# Patient Record
Sex: FEMALE | Race: WHITE | NOT HISPANIC OR LATINO | Employment: OTHER
[De-identification: names, ages, dates, MRNs, and addresses within clinical notes are randomized per-mention and may not be internally consistent; named-entity substitution may affect disease eponyms.]

---

## 2017-10-15 ENCOUNTER — HEALTH MAINTENANCE LETTER (OUTPATIENT)
Age: 22
End: 2017-10-15

## 2021-07-15 ENCOUNTER — HOSPITAL ENCOUNTER (INPATIENT)
Facility: CLINIC | Age: 26
LOS: 13 days | Discharge: PSYCHIATRIC OR SUBSTANCE USE FACILITY | End: 2021-07-29
Attending: EMERGENCY MEDICINE | Admitting: PSYCHIATRY & NEUROLOGY
Payer: COMMERCIAL

## 2021-07-15 DIAGNOSIS — Z11.52 ENCOUNTER FOR SCREENING LABORATORY TESTING FOR SEVERE ACUTE RESPIRATORY SYNDROME CORONAVIRUS 2 (SARS-COV-2): ICD-10-CM

## 2021-07-15 DIAGNOSIS — R45.851 SUICIDAL IDEATION: ICD-10-CM

## 2021-07-15 DIAGNOSIS — F25.9 SCHIZOAFFECTIVE DISORDER, SUBCHRONIC CONDITION (H): Primary | ICD-10-CM

## 2021-07-15 DIAGNOSIS — R44.0 AUDITORY HALLUCINATIONS: ICD-10-CM

## 2021-07-15 PROCEDURE — C9803 HOPD COVID-19 SPEC COLLECT: HCPCS | Performed by: EMERGENCY MEDICINE

## 2021-07-15 PROCEDURE — 99285 EMERGENCY DEPT VISIT HI MDM: CPT | Mod: 25 | Performed by: EMERGENCY MEDICINE

## 2021-07-15 PROCEDURE — 99285 EMERGENCY DEPT VISIT HI MDM: CPT | Performed by: EMERGENCY MEDICINE

## 2021-07-15 ASSESSMENT — ENCOUNTER SYMPTOMS
FEVER: 0
ABDOMINAL PAIN: 0
SHORTNESS OF BREATH: 0

## 2021-07-16 ENCOUNTER — DOCUMENTATION ONLY (OUTPATIENT)
Dept: OTHER | Facility: CLINIC | Age: 26
End: 2021-07-16

## 2021-07-16 ENCOUNTER — TELEPHONE (OUTPATIENT)
Dept: BEHAVIORAL HEALTH | Facility: CLINIC | Age: 26
End: 2021-07-16

## 2021-07-16 PROBLEM — R45.851 SUICIDAL IDEATION: Status: ACTIVE | Noted: 2021-07-16

## 2021-07-16 LAB
SARS-COV-2 RNA RESP QL NAA+PROBE: NEGATIVE
VALPROATE SERPL-MCNC: 10 MG/L

## 2021-07-16 PROCEDURE — 80307 DRUG TEST PRSMV CHEM ANLYZR: CPT | Performed by: STUDENT IN AN ORGANIZED HEALTH CARE EDUCATION/TRAINING PROGRAM

## 2021-07-16 PROCEDURE — 124N000002 HC R&B MH UMMC

## 2021-07-16 PROCEDURE — 250N000013 HC RX MED GY IP 250 OP 250 PS 637: Performed by: EMERGENCY MEDICINE

## 2021-07-16 PROCEDURE — 82306 VITAMIN D 25 HYDROXY: CPT | Performed by: STUDENT IN AN ORGANIZED HEALTH CARE EDUCATION/TRAINING PROGRAM

## 2021-07-16 PROCEDURE — 250N000013 HC RX MED GY IP 250 OP 250 PS 637: Performed by: STUDENT IN AN ORGANIZED HEALTH CARE EDUCATION/TRAINING PROGRAM

## 2021-07-16 PROCEDURE — 36415 COLL VENOUS BLD VENIPUNCTURE: CPT | Performed by: STUDENT IN AN ORGANIZED HEALTH CARE EDUCATION/TRAINING PROGRAM

## 2021-07-16 PROCEDURE — 90791 PSYCH DIAGNOSTIC EVALUATION: CPT

## 2021-07-16 PROCEDURE — 87635 SARS-COV-2 COVID-19 AMP PRB: CPT | Performed by: EMERGENCY MEDICINE

## 2021-07-16 PROCEDURE — 250N000013 HC RX MED GY IP 250 OP 250 PS 637

## 2021-07-16 PROCEDURE — 80164 ASSAY DIPROPYLACETIC ACD TOT: CPT | Performed by: STUDENT IN AN ORGANIZED HEALTH CARE EDUCATION/TRAINING PROGRAM

## 2021-07-16 RX ORDER — OLANZAPINE 10 MG/1
10 TABLET, ORALLY DISINTEGRATING ORAL ONCE
Status: COMPLETED | OUTPATIENT
Start: 2021-07-16 | End: 2021-07-16

## 2021-07-16 RX ORDER — LANOLIN ALCOHOL/MO/W.PET/CERES
3 CREAM (GRAM) TOPICAL
Status: DISCONTINUED | OUTPATIENT
Start: 2021-07-16 | End: 2021-07-19

## 2021-07-16 RX ORDER — POLYETHYLENE GLYCOL 3350 17 G/17G
17 POWDER, FOR SOLUTION ORAL DAILY PRN
Status: DISCONTINUED | OUTPATIENT
Start: 2021-07-16 | End: 2021-07-29 | Stop reason: HOSPADM

## 2021-07-16 RX ORDER — ATOMOXETINE 40 MG/1
40 CAPSULE ORAL DAILY
Status: DISCONTINUED | OUTPATIENT
Start: 2021-07-17 | End: 2021-07-19

## 2021-07-16 RX ORDER — ACETAMINOPHEN 325 MG/1
650 TABLET ORAL EVERY 4 HOURS PRN
Status: DISCONTINUED | OUTPATIENT
Start: 2021-07-16 | End: 2021-07-29 | Stop reason: HOSPADM

## 2021-07-16 RX ORDER — OLANZAPINE 5 MG/1
5 TABLET, ORALLY DISINTEGRATING ORAL AT BEDTIME
Status: DISCONTINUED | OUTPATIENT
Start: 2021-07-16 | End: 2021-07-19

## 2021-07-16 RX ORDER — HYDROXYZINE HYDROCHLORIDE 25 MG/1
25 TABLET, FILM COATED ORAL EVERY 4 HOURS PRN
Status: DISCONTINUED | OUTPATIENT
Start: 2021-07-16 | End: 2021-07-29 | Stop reason: HOSPADM

## 2021-07-16 RX ORDER — OLANZAPINE 10 MG/1
10 TABLET ORAL 3 TIMES DAILY PRN
Status: DISCONTINUED | OUTPATIENT
Start: 2021-07-16 | End: 2021-07-29 | Stop reason: HOSPADM

## 2021-07-16 RX ORDER — MAGNESIUM HYDROXIDE/ALUMINUM HYDROXICE/SIMETHICONE 120; 1200; 1200 MG/30ML; MG/30ML; MG/30ML
30 SUSPENSION ORAL EVERY 4 HOURS PRN
Status: DISCONTINUED | OUTPATIENT
Start: 2021-07-16 | End: 2021-07-29 | Stop reason: HOSPADM

## 2021-07-16 RX ORDER — DIVALPROEX SODIUM 500 MG/1
500 TABLET, DELAYED RELEASE ORAL 2 TIMES DAILY
Status: DISCONTINUED | OUTPATIENT
Start: 2021-07-16 | End: 2021-07-17

## 2021-07-16 RX ORDER — OLANZAPINE 10 MG/2ML
10 INJECTION, POWDER, FOR SOLUTION INTRAMUSCULAR 3 TIMES DAILY PRN
Status: DISCONTINUED | OUTPATIENT
Start: 2021-07-16 | End: 2021-07-29 | Stop reason: HOSPADM

## 2021-07-16 RX ADMIN — MELATONIN TAB 3 MG 3 MG: 3 TAB at 23:23

## 2021-07-16 RX ADMIN — OLANZAPINE 5 MG: 5 TABLET, ORALLY DISINTEGRATING ORAL at 20:32

## 2021-07-16 RX ADMIN — OLANZAPINE 10 MG: 10 TABLET, ORALLY DISINTEGRATING ORAL at 00:41

## 2021-07-16 ASSESSMENT — ACTIVITIES OF DAILY LIVING (ADL)
DRESS: SCRUBS (BEHAVIORAL HEALTH)
CONCENTRATING,_REMEMBERING_OR_MAKING_DECISIONS_DIFFICULTY: NO
DIFFICULTY_COMMUNICATING: NO
LAUNDRY: WITH SUPERVISION
TOILETING_ISSUES: NO
DRESSING/BATHING_DIFFICULTY: NO
HEARING_DIFFICULTY_OR_DEAF: NO
WALKING_OR_CLIMBING_STAIRS_DIFFICULTY: NO
WEAR_GLASSES_OR_BLIND: NO
FALL_HISTORY_WITHIN_LAST_SIX_MONTHS: NO
DIFFICULTY_EATING/SWALLOWING: NO
HYGIENE/GROOMING: INDEPENDENT
DOING_ERRANDS_INDEPENDENTLY_DIFFICULTY: NO
ORAL_HYGIENE: INDEPENDENT

## 2021-07-16 ASSESSMENT — ENCOUNTER SYMPTOMS
HALLUCINATIONS: 1
SLEEP DISTURBANCE: 1

## 2021-07-16 ASSESSMENT — MIFFLIN-ST. JEOR: SCORE: 2071.72
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
## 2021-07-16 NOTE — SAFE
Erika Claudio  July 16, 2021    Pt is a 25 y.o.  female comes to ED by ambulance for assessment of aggressive and psychotic symptoms, new onset of auditory hallucinations following recent medication change with increased an labile mood, impaired sleep and property damage, some implied but no active suicidal statements.  Mother is guardian and agreeable to voluntary admission.      Current Suicidal Ideation/Self-Injurious Concerns/Methods: None - N/A    Inappropriate Sexual Behavior: No    Aggression/Homicidal Ideation: Agitation/Hyperactivity and Impaired Self-Control      For additional details see full DEC assessment.       Henry Hameed

## 2021-07-16 NOTE — ED NOTES
[facility] ED Mental Health Handoff Note:       Brief HPI:  This is a 25 year old female signed out to me by Dr. Joe.  See initial ED Provider note for full details of the presentation. Interval history is pertinent for SI and hallucinations.    Home meds reviewed and ordered/administered: Yes    Medically stable for inpatient mental health admission: Yes.    Evaluated by mental health: Yes. The recommendation is for inpatient mental health treatment. Bed search in process    Safety concerns: At the time I received sign out, there were no safety concerns.    Hold Status:  Active Orders   N/A            Exam:   Patient Vitals for the past 24 hrs:   BP Temp Temp src Pulse SpO2   07/15/21 2217 118/82 (!) 96.7  F (35.9  C) Oral (!) 128 97 %           ED Course:    Medications   OLANZapine zydis (zyPREXA) ODT tab 10 mg (10 mg Oral Given 7/16/21 0041)            There were no significant events during my shift.    Patient was signed out to the oncoming provider      Impression:    ICD-10-CM    1. Auditory hallucinations  R44.0    2. Suicidal ideation  R45.851        Plan:    1. Awaiting inpatient mental health admission/transfer. Patient Holdable if tries to leave      RESULTS:   Results for orders placed or performed during the hospital encounter of 07/15/21 (from the past 24 hour(s))   Asymptomatic COVID-19 Virus (Coronavirus) by PCR Nasopharyngeal     Status: Normal    Collection Time: 07/16/21 12:36 AM    Specimen: Nasopharyngeal; Swab    Narrative    The following orders were created for panel order Asymptomatic COVID-19 Virus (Coronavirus) by PCR Nasopharyngeal.  Procedure                               Abnormality         Status                     ---------                               -----------         ------                     SARS-COV2 (COVID-19) Vir...[[id number]]  Normal              Final result                 Please view results for these tests on the individual orders.   SARS-COV2 (COVID-19)  Virus RT-PCR     Status: Normal    Collection Time: 07/16/21 12:36 AM    Specimen: Nasopharyngeal; Swab   Result Value Ref Range    SARS CoV2 PCR Negative Negative    Narrative    Testing was performed using the felecia  SARS-CoV-2 & Influenza A/B Assay on the felecia  Aby  System.  This test should be ordered for the detection of SARS-COV-2 in individuals who meet SARS-CoV-2 clinical and/or epidemiological criteria. Test performance is unknown in asymptomatic patients.  This test is for in vitro diagnostic use under the FDA EUA for laboratories certified under CLIA to perform moderate and/or high complexity testing. This test has not been FDA cleared or approved.  A negative test does not rule out the presence of PCR inhibitors in the specimen or target RNA in concentration below the limit of detection for the assay. The possibility of a false negative should be considered if the patient's recent exposure or clinical presentation suggests COVID-19.  [facility] are certified under the Clinical Laboratory Improvement Amendments of 1988 (CLIA-88) as qualified to perform moderate and/or high complexity laboratory testing.             MD Vince Lockwood Matthew Joseph, MD  07/16/21 0451

## 2021-07-16 NOTE — ED NOTES
Bed: HW04  Expected date:   Expected time:   Means of arrival:   Comments:  [facility] [address] 25F WAYNE eason

## 2021-07-16 NOTE — ED NOTES
Spoke with pt in room. Calm in room. Cooperative with mental health assessment. Denies HI/SI. Denies auditory or visual hallucinations. Dress is appropriate to situation. Attentive to conversation. Quiet voice. Pt resting in bed. No further needs at this time.

## 2021-07-16 NOTE — ED NOTES
Writer talked with pt's  Dougie ([phone]). SW'er recommended placement at either [facility] or [facility] because they're closer to the SW'er and family. Will escalate this request. Mother is located in [place].

## 2021-07-16 NOTE — TELEPHONE ENCOUNTER
"Patient cleared and ready for behavioral bed placement: Yes   S: DEC call/chart review, 0022, 25/F, [place] ED, hallucinations and aggressive behaviors    B: Hx of schizoaffective and intellectual disability. Pt reports AH of voices that whisper negative things about her. Pt's mother reports pt has become more agitated/labile at home after a medication change from Zyprexa to Seroquel. Pt has been yelling and damaging property. No aggression reported in ED. No hx of drug/etoh use. Hx of Inpt MH admissions through [facility] - recently discharged from [place] on Monday. Currently on a MI commitment through [facility], started Oct of 2020.    A: Voluntary - mom is guardian and willing to go anywhere for placement. No paperwork in chart  No medical concerns. Ambulates independently.  Asymptomatic for Covid - test processing  UDS: Not ordered  HCG: Not ordered    R: Pt placed on work list  0028 - DEC called back. Reported he found something on the [place] court records stating mom is guardian and will fax to intake. \"[employer]\" r/t guardianship is in Right Fax time stamped: 7/16/2021 @ 12:32AM. No appropriate beds currently available at [facility] or [facility]. Unsure how far mom is willing to seek placement. 0446 - Called ED and requested if willing to go outside of [facility] system and how far, to call intake back.  Pt remains on work list and intake seeking appropriate placement options.    "

## 2021-07-16 NOTE — ED NOTES
Writer received a call from Lauren Bay ( with [facility]). Lauren wanted to remind the team that pt is on a civil commitment until 3/20/2022.  would like an update on placement when available. They can be reached at [phone].
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
## 2021-07-16 NOTE — ED PROVIDER NOTES
ED Provider Note  [facility]      History     Chief Complaint   Patient presents with     Hallucinations     Per EMS, patient is schizoaffective, family could not handle her.  Pt was seen at [facility] on 7/12/21.  Family wanted her to come here.     The history is provided by the patient and the EMS personnel.     Erika Claudio is a 25 year old female with a medical history significant for intellectual disability, schizoaffective disorder versus bipolar affective disorder, ADHD, PTSD and methamphetamine abuse who presents to the Emergency Department for mental health evaluation.  Patient here reports that she was angry at her brother so her brother called the  on her.  She reports that she was brought here by the police for further evaluation.  Patient reports that she was angry at her brother because she thought he was calling her names.  She denies trying to hurt her brother.  Patient denies any suicidal or homicidal ideation.  She denies any hallucinations.  She denies any drug or alcohol use.    Per EMS, they were told by the patient's family that she has a history of schizoaffective disorder and her family was unable to handle her tonight, so they wanted her to be brought here for evaluation.  EMS also reported that the patient was seen at [facility] on 7/12/2021, but we do not have access to these records.    We did have the DEC  evaluate the patient and they were able to get additional history.  The patient is currently on Depakote, Strattera and hydroxyzine.  The patient was recently taken off of Zyprexa a few weeks ago and was started on Seroquel on 7/9/2021 (7 days ago).  The family reports that since the patient was taken off of the Zyprexa she has been having auditory hallucinations, which she has never had in the past.  Patient is hearing whispers of people that are talking about her.  Patient will then go after people near her as she believes that they are the  "ones talking about her.  Today, the patient damaged a door trying to get to her brother.  The mother also adds that the patient has been making suicidal comments recently.  The patient does not have any history of suicidal ideation in the past.  She reports that the patient the other day made a statement \"good thing you did not let me go for a walk yesterday otherwise I would have walked straight off of the bridge\".  The mother adds that the patient has not been sleeping well at night.  Family does not have any concern for drug or alcohol abuse.  The patient has not had any self-injurious behavior.  The mother also notes that the patient is supposed to be in a group home, but this was not a stable place for her so her  has been trying to find new placement for the patient.    Please see DEC 's note in Epic dated 07/15/21 for further details.      Past Medical History  History reviewed. No pertinent past medical history.  History reviewed. No pertinent surgical history.  No current outpatient medications on file.    No Known Allergies  Past medical history, past surgical history, medications, and allergies were reviewed with the patient. Additional pertinent items: bipolar affective disorder, ADHD, PTSD and methamphetamine abuse    Family History  History reviewed. No pertinent family history.  Family history was reviewed with the patient. Additional pertinent items: None    Social History  Social History     Tobacco Use     Smoking status: Never Smoker     Smokeless tobacco: Never Used   Substance Use Topics     Alcohol use: Not Currently     Drug use: Never      Social history was reviewed with the patient. Additional pertinent items: None      Review of Systems   Constitutional: Negative for fever.   Respiratory: Negative for shortness of breath.    Cardiovascular: Negative for chest pain.   Gastrointestinal: Negative for abdominal pain.   Psychiatric/Behavioral: Positive for behavioral " problems, hallucinations (auditory), sleep disturbance and suicidal ideas.   All other systems reviewed and are negative.        Physical Exam   BP: 118/82  Pulse: (!) 128  Temp: (!) 96.7  F (35.9  C)  SpO2: 97 %  Physical Exam  Vitals and nursing note reviewed.   Constitutional:       General: She is not in acute distress.     Appearance: She is well-developed. She is not ill-appearing or toxic-appearing.   HENT:      Head: Normocephalic and atraumatic.   Eyes:      General: No scleral icterus.     Pupils: Pupils are equal, round, and reactive to light.   Cardiovascular:      Rate and Rhythm: Normal rate.   Pulmonary:      Effort: Pulmonary effort is normal. No respiratory distress.   Musculoskeletal:      Cervical back: Normal range of motion and neck supple.   Skin:     General: Skin is warm and dry.      Coloration: Skin is not pale.      Findings: No rash.   Neurological:      Mental Status: She is alert and oriented to person, place, and time.      Sensory: No sensory deficit.   Psychiatric:         Attention and Perception: Attention normal.         Mood and Affect: Mood is anxious. Affect is labile.         Speech: Speech normal.         Behavior: Behavior is cooperative.         Thought Content: Thought content is paranoid. Thought content is not delusional. Thought content does not include homicidal or suicidal (patient denies) ideation.      Comments: Patient sitting in the hallway in a chair, she is leaning forward in her chair and she talks to me she is intermittently diverting her gaze off to the side as though responding to something.  She appears somewhat frightened and anxious, but is able to cooperate with history.           ED Course      Procedures     10:29 PM  The patient was seen and examined by Len Joe MD in [facility].             Results for orders placed or performed during the hospital encounter of 07/15/21   SARS-COV2 (COVID-19) Virus RT-PCR     Status: Normal    Specimen:  Nasopharyngeal; Swab   Result Value Ref Range    SARS CoV2 PCR Negative Negative    Narrative    Testing was performed using the felecia  SARS-CoV-2 & Influenza A/B Assay on the felecia  Aby  System.  This test should be ordered for the detection of SARS-COV-2 in individuals who meet SARS-CoV-2 clinical and/or epidemiological criteria. Test performance is unknown in asymptomatic patients.  This test is for in vitro diagnostic use under the FDA EUA for laboratories certified under CLIA to perform moderate and/or high complexity testing. This test has not been FDA cleared or approved.  A negative test does not rule out the presence of PCR inhibitors in the specimen or target RNA in concentration below the limit of detection for the assay. The possibility of a false negative should be considered if the patient's recent exposure or clinical presentation suggests COVID-19.  [facility] are certified under the Clinical Laboratory Improvement Amendments of 1988 (CLIA-88) as qualified to perform moderate and/or high complexity laboratory testing.   Asymptomatic COVID-19 Virus (Coronavirus) by PCR Nasopharyngeal     Status: Normal    Specimen: Nasopharyngeal; Swab    Narrative    The following orders were created for panel order Asymptomatic COVID-19 Virus (Coronavirus) by PCR Nasopharyngeal.  Procedure                               Abnormality         Status                     ---------                               -----------         ------                     SARS-COV2 (COVID-19) Vir...[[id number]]  Normal              Final result                 Please view results for these tests on the individual orders.     Medications   OLANZapine zydis (zyPREXA) ODT tab 10 mg (10 mg Oral Given 7/16/21 0041)        Assessments & Plan (with Medical Decision Making)     Patient presented to the emergency department after lashing out at home and anger because she thought that her brother was saying things about her.   Please refer to the DEC 's report for more formal history of visit.  According to supplemental history patient has been hearing voices whispering and calling her names.  She is become increasingly anxious and paranoid about this to the point where she has at times lashed out violently.  She has never hurt anyone but when convinced that other people are talking about her she does get quite angry.  She had also made suicidal statements to her family but she denies this to me.  I can find no acute medical condition that would preclude a mental health admission and at this time feel admission for stabilization and medical management is warranted.  Patient's mother is her guardian and is in agreement with the plan.  The  spoke with mental health intake and bed is being arranged.    I have reviewed the nursing notes. I have reviewed the findings, diagnosis, plan and need for follow up with the patient.    New Prescriptions    No medications on file       Final diagnoses:   Auditory hallucinations   Suicidal ideation       --  I, Gerardo Richey, am serving as a trained medical scribe to document services personally performed by Len Joe MD, based on the provider's statements to me.     ILen MD, was physically present and have reviewed and verified the accuracy of this note documented by Gerardo Richey.    Len Joe MD  [facility] EMERGENCY DEPARTMENT  7/15/2021     Len Joe MD  07/16/21 2595

## 2021-07-16 NOTE — TELEPHONE ENCOUNTER
R: The pt is currently in the [place] ER awaiting bed placement.    9:45a Intake called [place] [facility] RN to get the pt's bed placement preferences. RN will connect with the pt and call intake back.     9:49a Per RN the pt would like [facility] only for placement.    Per [facility]- no beds available at this time.     [facility] is at capacity. The pt remains on the work-list. Intake identifying appropriate bed placement.     10:50a Intake checked bed status for units- Unit 20 has pending discharges.     11:36a Intake paged provider to present for Unit 20 ([facility]).     11:37a Provider returned intakes page- provider accepts the pt.    11:48a Intake called ER RN for an up to date note on the pt's status- no note has been placed today.    11:57a Intake called Unit 20 Charge RN to go over admission in que. Per Charge RN they will call the ER for report, unit needs to wait for discharges and cleaning.     12:00p Intake called [place] [facility] to go over bed placement information.

## 2021-07-16 NOTE — PROGRESS NOTES
07/16/21 1623   Patient Belongings   Did you bring any home meds/supplements to the hospital?  No   Patient Belongings locker   Belongings Search Yes   Clothing Search Yes   Second Staff Leeann Z       2 puzzles books  1 sun glasses  2 PJ pants  1 black shirt  1 under wear  1 blanket  2 pair of slippers  1 bra  A               Admission:  I am responsible for any personal items that are not sent to the safe or pharmacy.  [facility] is not responsible for loss, theft or damage of any property in my possession.    Signature:  _________________________________ Date: _______  Time: _____                                              Staff Signature:  ____________________________ Date: ________  Time: _____      2nd Staff person, if patient is unable/unwilling to sign:    Signature: ________________________________ Date: ________  Time: _____     Discharge:  [facility] has returned all of my personal belongings:    Signature: _________________________________ Date: ________  Time: _____                                          Staff Signature:  ____________________________ Date: ________  Time: _____

## 2021-07-16 NOTE — ED NOTES
"7/15/2021  Erika Claudio 1995     [facility] Crisis Assessment:    Started at: 12:20 AM  Completed at: 01:30 AM  Patient was assessed via in-person.    Chief Complaint and History of Presenting Problem:    Pt is a 25 y.o.  female comes to [facility] by ambulance for assessment of aggressive and psychotic symptoms.  Assessment and intervention involved meeting with pt, obtaining collateral from [facility] and [facility] records and from mother/guardian Magnolia by phone ([phone]), employing crisis psychotherapy, supportive and active listening and motivational interviewing techniques.  Please see list of involved providers and coping strategies used in other sections of this document.  Pt is alert, oriented x 3, adequately dressed and groomed, speech is articulate, delayed rate and normal volume, mood is anxious, affect restricted, thought process organized, content includes reported psychosis, cooperative with the assessment process.      Background    Patient lives with family members in home with mother and younger brother. She stopped attending HS in the 10th grade, has IEP.  Patient is not currently employed. Patient receives additional income from Disability: SSDI about $800/month. Patient is under guardianship and commitment    Mental Health History and Current Symptoms     Patient identifies historical diagnoses of Schizoaffective D/O, Intellectual Disability, PTSD and Amphetamine Use D/O. Pt reports ambulance was called tonight because she had punched a hole in the door of her brother's bedroom because \"I was mad\" at him calling her names.  She says she did this Thursday morning about 9 AM, but he was not there, had gone to work (she thought he was home).  She says that when he later got home he and her mother \"thought I was hearing voices - which I'm not - and sent me here\".  Pt denies she has symptoms of psychosis, but admits she hears people \"whispering\" about her and notes that since her arrival " "she knows that security and other patients have been whispering about her in the ED.  She says she gets mad about this, feels antagonized and then gets agitated.  She says she has not been sleeping well, her \"sleep schedule is off\", awake much of the night then sleeping a lot in the day.  She often can't remember recent events, not clear if she is avoiding the response or clearly can't recall.  She says she has not been taking her medications because she doesn't like them, denies recent drug or alcohol use, denies recent or current suicidal thoughts.  She says that \"a long time ago\" she has had suicida, self injurious and has engaged in some property damage, but none of these in \"years\".  She is not currently seeing a therapist.    Current Providers  Primary Care Provider: Yes and Laura Franco MD    Psychiatrist: Yes and Gee Amador DO    Therapist: No  : Yes and David Salas    Has an PIETER been signed? No ;     History of psychiatric hospitalizations? Yes  History of civil commitment? Yes  History of programmatic care? No  Current psychotropic medications? Yes  Medication Compliant? No  Recent medication changes? No    Relevant Medical Concerns  Patient identifies concerns with completing ADLs? Yes and Impaired sleep  Patient can ambulate independently? Yes  Other medical health concerns? No  History of concussion or TBI? No     Abuse, Maltreatment, and Trauma History  Physical abuse: No  Emotional/psychological abuse: No  Sexual abuse: No  Loss of a friend or family member to suicide: No  Other identified traumatic event or significant stressor: No    Current Symptoms  Attention, Hyperactivity, and Impulsivity: Yes: Impulsive   Anxiety:Yes: Generalized Symptoms: Agitation, Avoidance and Excessive worry    Behavioral Difficulties: Yes: Impulsivity/Disinhibition and Negativistic/Defiant   Mood Symptoms: Yes: Aggression, Feelings of helplessness  and Sleep disturbance  " "  Appetite: No   Feeding and Eating: No  Interpersonal Functioning: Yes: Displaces Blame, Impaired Impulse Control and Impaired Interpersonal Functioning  Learning Disabilities/Cognitive/Developmental Disorders: Yes: Developmental Incidents, Functional Impairments and Self-Regulation   General Cognitive Impairments: No  If yes, see completed Mini-Cog Assessment below.  Sleep: No   Psychosis: Yes: Delusions: Paranoid: Auditory hallucinations that people are talking about her    Trauma: No     Substance Use  Patient does  have a history of substance use which includes amphetamine use disorder, denies recent use.    History of Suicidal Ideation, Suicide Attempts, and Risk Formulation:     Pt reports recent suicidal thoughts, denies current ideation or intent, mother says pt has made several recent threats including \"had you let me take a walk today I would have jumped off the bridge\" and \"watch it or you'll have another  to go to\".    ESS-6  1.a. Over the past 2 weeks, have you had thoughts of killing yourself? Yes   1.b. Have you ever attempted to kill yourself and, if yes, when did this last happen? Yes Within last 6 months  2. Recent or current suicide plan? No  3. Recent or current intent to act on ideation? No  4. Lifetime psychiatric hospitalization? Yes  5. Pattern of excessive substance use? No  6. Current irritability, agitation, or aggression? No  ESS-6 Score: Mild    Patient has past history of self-injury, no recent behavior    Other Risk Areas    Aggressive/assumptive/homicidal risk factors: Yes: Agitation / Hyperactivity and Impaired Self Control   Duty to warn?No   Was a Child Protection Report Made? No   Was a Adult Protection Report Made? No      Sexually inappropriate behavior? No      Vulnerability to sexual exploitation? No     Mental Status Exam:  Affect: Constricted  Appearance: Appropriate   Attention Span/Concentration: Attentive    Eye Contact: Variable  Fund of Knowledge: Appropriate "   Language /Speech Content: Fluent  Language /Speech Volume: Normal   Language /Speech Rate/Productions: Normal   Recent Memory: Variable  Remote Memory: Variable  Mood: Anxious and Depressed   Orientation:   Person: Yes   Place: Yes  Time of Day: Yes   Date: Yes   Situation (Do they understand why they are here?): Yes   Psychomotor Behavior: Normal   Thought Content: Clear  Thought Form: Intact    Clinical Summary and Disposition  Clinical summary:     Pt comes by ambulance after increased symptoms of catalina and psychosis, guardian reports recent change in medications, Zyprexa stopped several weeks ago and replaced with Seroquel, guardian not clear the reason, now experiencing auditory hallucinations, mood and behavioral dysregulation and recent property damage, guardian does not feel pt is stable to be in the community, recommend inpatient admission.    Diagnosis:    Schizoaffective D/O; Mild intellectual functioning; amphetamine use disorder by history.    Disposition:  Attending provider, Dr. Joe consulted and does  agree with recommended disposition which includes Inpatient Mental Health. Patient agrees with recommended level of care.      Details of final disposition include: Inpatient mental health .     If Inpatient, is patient admitted voluntary? Yes   Patient aware of potential for transfer if there is not appropriate placement? Yes  Patient is willing to travel outside of the [facility] for placement? Yes  Central Intake Notified? Yes: Date: 7/16/2021 Time: 12:49 AM.    Duration of face to face time with patient in minutes: 1.0 hrs    CPT code(s) utilized: 83541 - Psychotherapy for Crisis - 60 (30-74*) min      Henry Hameed

## 2021-07-17 LAB
ALBUMIN SERPL-MCNC: 2.8 G/DL (ref 3.4–5)
ALP SERPL-CCNC: 47 U/L (ref 40–150)
ALT SERPL W P-5'-P-CCNC: 17 U/L (ref 0–50)
AMPHETAMINES UR QL SCN: NORMAL
ANION GAP SERPL CALCULATED.3IONS-SCNC: 2 MMOL/L (ref 3–14)
AST SERPL W P-5'-P-CCNC: 7 U/L (ref 0–45)
BARBITURATES UR QL: NORMAL
BENZODIAZ UR QL: NORMAL
BILIRUB SERPL-MCNC: 0.4 MG/DL (ref 0.2–1.3)
BUN SERPL-MCNC: 13 MG/DL (ref 7–30)
CALCIUM SERPL-MCNC: 8.5 MG/DL (ref 8.5–10.1)
CANNABINOIDS UR QL SCN: NORMAL
CHLORIDE BLD-SCNC: 112 MMOL/L (ref 94–109)
CHOLEST SERPL-MCNC: 155 MG/DL
CO2 SERPL-SCNC: 25 MMOL/L (ref 20–32)
COCAINE UR QL: NORMAL
CREAT SERPL-MCNC: 0.78 MG/DL (ref 0.52–1.04)
ETHANOL UR QL SCN: NORMAL
FASTING STATUS PATIENT QL REPORTED: YES
FOLATE SERPL-MCNC: 9.8 NG/ML
GFR SERPL CREATININE-BSD FRML MDRD: >90 ML/MIN/1.73M2
GLUCOSE BLD-MCNC: 88 MG/DL (ref 70–99)
HDLC SERPL-MCNC: 31 MG/DL
LDLC SERPL CALC-MCNC: 95 MG/DL
NONHDLC SERPL-MCNC: 124 MG/DL
OPIATES UR QL SCN: NORMAL
POTASSIUM BLD-SCNC: 4 MMOL/L (ref 3.4–5.3)
PROT SERPL-MCNC: 5.9 G/DL (ref 6.8–8.8)
SODIUM SERPL-SCNC: 139 MMOL/L (ref 133–144)
TRIGL SERPL-MCNC: 145 MG/DL
TSH SERPL DL<=0.005 MIU/L-ACNC: 2.6 MU/L (ref 0.4–4)
VIT B12 SERPL-MCNC: 618 PG/ML (ref 193–986)

## 2021-07-17 PROCEDURE — 85027 COMPLETE CBC AUTOMATED: CPT | Performed by: STUDENT IN AN ORGANIZED HEALTH CARE EDUCATION/TRAINING PROGRAM

## 2021-07-17 PROCEDURE — 82040 ASSAY OF SERUM ALBUMIN: CPT | Performed by: STUDENT IN AN ORGANIZED HEALTH CARE EDUCATION/TRAINING PROGRAM

## 2021-07-17 PROCEDURE — 36415 COLL VENOUS BLD VENIPUNCTURE: CPT | Performed by: STUDENT IN AN ORGANIZED HEALTH CARE EDUCATION/TRAINING PROGRAM

## 2021-07-17 PROCEDURE — 250N000013 HC RX MED GY IP 250 OP 250 PS 637

## 2021-07-17 PROCEDURE — 250N000013 HC RX MED GY IP 250 OP 250 PS 637: Performed by: STUDENT IN AN ORGANIZED HEALTH CARE EDUCATION/TRAINING PROGRAM

## 2021-07-17 PROCEDURE — 83036 HEMOGLOBIN GLYCOSYLATED A1C: CPT | Performed by: STUDENT IN AN ORGANIZED HEALTH CARE EDUCATION/TRAINING PROGRAM

## 2021-07-17 PROCEDURE — 124N000002 HC R&B MH UMMC

## 2021-07-17 PROCEDURE — 82746 ASSAY OF FOLIC ACID SERUM: CPT | Performed by: STUDENT IN AN ORGANIZED HEALTH CARE EDUCATION/TRAINING PROGRAM

## 2021-07-17 PROCEDURE — 84443 ASSAY THYROID STIM HORMONE: CPT | Performed by: STUDENT IN AN ORGANIZED HEALTH CARE EDUCATION/TRAINING PROGRAM

## 2021-07-17 PROCEDURE — 82607 VITAMIN B-12: CPT | Performed by: STUDENT IN AN ORGANIZED HEALTH CARE EDUCATION/TRAINING PROGRAM

## 2021-07-17 PROCEDURE — 99223 1ST HOSP IP/OBS HIGH 75: CPT | Mod: AI | Performed by: PSYCHIATRY & NEUROLOGY

## 2021-07-17 PROCEDURE — 80061 LIPID PANEL: CPT | Performed by: STUDENT IN AN ORGANIZED HEALTH CARE EDUCATION/TRAINING PROGRAM

## 2021-07-17 RX ORDER — ATOMOXETINE 40 MG/1
40 CAPSULE ORAL DAILY
Status: ON HOLD | COMMUNITY
End: 2021-07-28

## 2021-07-17 RX ORDER — HYDROXYZINE PAMOATE 25 MG/1
25 CAPSULE ORAL EVERY 6 HOURS PRN
Status: ON HOLD | COMMUNITY
End: 2021-07-28

## 2021-07-17 RX ORDER — MEDROXYPROGESTERONE ACETATE 150 MG/ML
150 INJECTION, SUSPENSION INTRAMUSCULAR
Status: ON HOLD | COMMUNITY
End: 2021-07-28

## 2021-07-17 RX ORDER — QUETIAPINE FUMARATE 25 MG/1
25 TABLET, FILM COATED ORAL
Status: ON HOLD | COMMUNITY
End: 2021-07-28

## 2021-07-17 RX ORDER — DOCOSANOL 100 MG/G
CREAM TOPICAL
COMMUNITY

## 2021-07-17 RX ADMIN — MELATONIN TAB 3 MG 3 MG: 3 TAB at 22:17

## 2021-07-17 RX ADMIN — OLANZAPINE 5 MG: 5 TABLET, ORALLY DISINTEGRATING ORAL at 20:22

## 2021-07-17 RX ADMIN — ATOMOXETINE 40 MG: 40 CAPSULE ORAL at 08:54

## 2021-07-17 ASSESSMENT — ACTIVITIES OF DAILY LIVING (ADL)
HYGIENE/GROOMING: INDEPENDENT
ORAL_HYGIENE: INDEPENDENT
DRESS: INDEPENDENT
LAUNDRY: WITH SUPERVISION

## 2021-07-17 NOTE — PLAN OF CARE
Initial Psychosocial Assessment    I have reviewed the chart, met with the patient, and developed Care Plan.  Information for assessment was obtained from:   Pt refused to meet with Writer was resting in bed, offered to come back later, and she stated she would still not meet with Writer.   Writer did use information from H&P completed by Dr. Rodney Cardenas MD and Admitting RN on 7/16/2021 this information is noted and italicized.     Presenting Problem:  Per Admitting RN on Station 20 on 7/16/2021:   ADMISSION  SITUATION  Received pt, 25 year old, femaile, came in for hallucinations and aggressive behaviors.   BACKGROUND  Pt has history of schizoaffective disorder, ADHD, PTSD, methamphetamine abuse and intellectual disability.   Per report, she was brought to the ER due to auditory hallucinations and pt's aggressive behaviors towards brother. Further per report she has been making suicidal comments recently.   ASSESSMENT  Presents with blunted affect, guarded, labile mood. She was slightly irritable  Pt denies SI/SIB/HI. Denies experiencing hallucinations. Denies having anxiety and depression as of time of assessment.   COVID negative  Pt has a guardian- Mother and is agreeable to voluntary admission.   Bruise on her right inner forearm noted. Pt denies any pain and would not answer writer as to how she got the bruise.   Still to collect urine sample. Pt claimed she will do it after dinner.   RECOMMENDATION  On suicide and assault precautions. Staff will continue to monitor. Pt to see psychiatrist in AM.    History of Mental Health and Chemical Dependency: (Per H&P)   Prior diagnoses: Schizoaffective D/O, Intellectual Disability, PTSD, ADHD and Amphetamine Use  Prior Hospitalizations: Yes, 7 in past year   Suicide attempts: Yes, in past  Self-injurious behavior: Yes, in past usually at [facility]   Violence: Property damage  ECT/TMS: No  History of abusing stimulants     Family Description ([facility], Family  Psychiatric History):  See notes     Significant Life Events (Illness, Abuse, Trauma, Death):  Unknown/None reported     Living Situation:  Currently living with mother and brother pending transfer to a new group home     Educational Background:  Stopped attending highschool in the 10th grade, had an IEP     Occupational History:  Unemployed     Financial Status:  SSDI     Legal Issues:  Under guardianship and is committed until 3/22/2022 per H&P   Guardian is Magnolia Tilley (mother) @ [phone]    Ethnic/Cultural Issues:  See notes     Spiritual Orientation:  None      Service History:  None    Social Functioning (organization, interests):  Not assessed     Current Treatment Providers are:  Primary Outpatient Psychiatrist: Gee Amador   Primary Physician: Laura Franco  Therapist: None  : Lorne Arciniega ([facility])  Probation/: None  Family Members: Mother is guardian, Magnolia [phone]    Social Service Assessment/Plan:  Patient has been admitted for psychiatric stabilization due to symptoms associated with psychosis and possible stimulant use. Patient will have psychiatric assessment and medication management by the psychiatrist. Medications will be reviewed and adjusted per MD as indicated. The treatment team will continue to assess and stabilize the patient's mental health symptoms with the use of medications and therapeutic programming. Hospital staff will provide a safe environment and a therapeutic milieu. Staff will continue to assess patient as needed. Patient will be encouraged to participate in unit groups and activities. Patient will receive individual and group support on the unit.  CTC will do individual inpatient treatment planning and after care planning. CTC will discuss options for increasing community supports with the patient. CTC will coordinate with outpatient providers and will place referrals to ensure appropriate follow up care is in place.

## 2021-07-17 NOTE — PLAN OF CARE
"Pt observed to be out in the milieu, pacing in the hallway, asking for snacks. Presents to be irritable and labile.   Pt still denies having hallucinations, anxiety and depression. Denies SI/SIB/HI.   Consumed 100% of share of dinner and took approximately 720 ml of fluids.   Pt refused to take divalproex. Re approached but still refused. Claimed \"No! I don't want it. I just want the other one.\"  Due Zyprexa given. Denies experiencing side effects.  Pt took a shower and was visited by guardian-Mother this shift.  Urine specimen sent to lab. Awaiting for results.   At 2320H, complained of having difficulty sleeping. PRN melatonin given.   Needs attended to. On suicide and assault precautions. Staff will continue to monitor.  "

## 2021-07-17 NOTE — PLAN OF CARE
Erika appeared to sleep a total of 6.5 hours this night shift.  No prns or snacks given or requested.

## 2021-07-17 NOTE — PHARMACY-ADMISSION MEDICATION HISTORY
"  Admission Medication History Completed by Pharmacy    See [facility] Admission Navigator for allergy information, preferred outpatient pharmacy, prior to admission medications and immunization status.     Medication History Sources:     The patient's legal guardian ([place]), [facility], and Care Everywhere.  7  Changes made to PTA medication list (reason):    Added:   o Atomoxetine 40 mg capsule (per patient's guardian, Care Everywhere and [facility])  o Quetiapine 25 mg tablet (per patient's guardian, Care Everywhere and [facility])  o Hydroxyzine 25 mg capsule (per patient's guardian, Care Everywhere and [facility])  o Depo-Provera 150 mg/mL IM injection (per patient's guardian and Care Everywhere)  o Abreva 10% external cream (per patient's guardian)    Deleted:   o None    Changed:   o None    Additional Information:    The patient granted permission to obtain medication list from her legal guardian. The patient's guardian was able to identify and confirm the patient's current medications and their last doses.    The patient's guardian stated that the patient's Concerta 36 mg tablets was put on hold by the patient's physician. She stated that the physician started the patient on quetiapine 25 mg and wanted to \"see how well it works for her.\"    The patient's guardian stated that the patient's physician discontinued divalproex sodium 500 mg DR this past week on Wednesday (7/14/21).    The patient's guardian stated that the patient takes no other prescription or OTC medications.    Prior to Admission medications    Medication Sig Last Dose Taking? Auth Provider   atomoxetine (STRATTERA) 40 MG capsule Take 40 mg by mouth daily Past Week at Unknown time Yes Unknown, Entered By History   docosanol (ABREVA) 10 % CREA cream Apply topically 5 x daily PRN for cold sore treatment Past Week at Unknown time Yes Unknown, Entered By History   hydrOXYzine (VISTARIL) 25 MG capsule Take 25 mg by mouth every 6 hours as needed " Past Week at Unknown time Yes Unknown, Entered By History   medroxyPROGESTERone (DEPO-PROVERA) 150 MG/ML IM injection Inject 150 mg into the muscle every 3 months 4/27/2021 Yes Unknown, Entered By History   QUEtiapine (SEROQUEL) 25 MG tablet Take 25 mg by mouth nightly as needed Past Week at Unknown time Yes Unknown, Entered By History       Date completed: 07/17/21    Medication history completed by: Trinidad Gallegos

## 2021-07-17 NOTE — PROGRESS NOTES
"Brief Note - Cross Cover    S: Erika declined her depakote for the past 24 hours. She is concerned by the possibility of liver damage, and is not reassured by her normal LFTS. She would like to be on a different medication. Discussed this with her guardian, who agrees to stop depakote, and wants to try using olanzapine for mood instead.    O: /60   Pulse 102   Temp 97.7  F (36.5  C) (Oral)   Resp 18   Ht 1.702 m (5' 7\")   Wt 130.4 kg (287 lb 8 oz)   LMP  (LMP Unknown)   SpO2 97%   BMI 45.03 kg/m       Depakote level: 10    A/P:  Erika Claudio is a 25 year old female with a past psychiatric history of schizoaffective disorder,  intellectual disability, PTSD, ADHD, and substance use disorder (amphetamine) who presented to the ED with out of control behaviors. She slept well last night. Depakote level implies non-adherence at home, likely due to her concern about liver toxicity. She was willing to continue olanzapine.    -discontinue depakote  -continue olanzapine    Henry Woods  PGY2 Psychiatry Resident    "

## 2021-07-17 NOTE — PLAN OF CARE
"  Problem: Suicidal Behavior  Goal: Suicidal Behavior is Absent or Managed  Outcome: No Change     Problem: Behavioral Health Plan of Care  Goal: Plan of Care Review  Outcome: No Change     Problem: Behavioral Health Plan of Care  Goal: Patient-Specific Goal (Individualization)  Outcome: No Change     Pt isolative to room this shift. Come out for meals. Appetite good. Pt refused Depakote in the morning. Pt is irritable this shift. Stated that \"there is just nothing, nothing to do that's why I am sleeping\". Pt denies SI/SIB/Voices/Anxiety/Depression. When asked what is her mood is, she said, \"tired\" then went back to lay down.   "

## 2021-07-18 PROCEDURE — 124N000002 HC R&B MH UMMC

## 2021-07-18 PROCEDURE — 99232 SBSQ HOSP IP/OBS MODERATE 35: CPT | Mod: GC | Performed by: PSYCHIATRY & NEUROLOGY

## 2021-07-18 PROCEDURE — 250N000013 HC RX MED GY IP 250 OP 250 PS 637

## 2021-07-18 RX ADMIN — OLANZAPINE 5 MG: 5 TABLET, ORALLY DISINTEGRATING ORAL at 21:04

## 2021-07-18 RX ADMIN — ATOMOXETINE 40 MG: 40 CAPSULE ORAL at 09:36

## 2021-07-18 ASSESSMENT — ACTIVITIES OF DAILY LIVING (ADL)
LAUNDRY: WITH SUPERVISION
ORAL_HYGIENE: INDEPENDENT
LAUNDRY: WITH SUPERVISION
DRESS: INDEPENDENT
HYGIENE/GROOMING: INDEPENDENT
DRESS: INDEPENDENT
ORAL_HYGIENE: INDEPENDENT
HYGIENE/GROOMING: INDEPENDENT

## 2021-07-18 NOTE — PLAN OF CARE
Pt appears to have slept for 7 hours. No PRNs or snacks given; no concerns noted this shift. Will continue to monitor and offer support.     Problem: Suicidal Behavior  Goal: Suicidal Behavior is Absent or Managed  Outcome: Improving

## 2021-07-18 NOTE — PLAN OF CARE
"  Problem: Adult Inpatient Plan of Care  Goal: Plan of Care Review  Outcome: No Change     Problem: Adult Inpatient Plan of Care  Goal: Patient-Specific Goal (Individualized)  Outcome: No Change       Pt remained in her room this entire time. Pt did not come out for breakfast and lunch. Took morning medication. Pt is irritable and screamed to staff at one point in the morning when blinds were opened for 15 minute check. When approached for lunch, pt still was irritable  and stated \"I don't want it and I don't wan na be disturbed\". Will continue to monitor.  "

## 2021-07-18 NOTE — PLAN OF CARE
This morning staff were doing rounds and open her blinds and patient yelled at staff to close the blinds. Pt in bed thru lunch.  Pt stated she wasn't going to get and wanted to be left alone.  Pt came out of room at about 2:15pm asked for her lunch tray and ate.  Pt has been walking the halls currently.  Problem: Adult Inpatient Plan of Care  Goal: Plan of Care Review  Outcome: No Change  Goal: Patient-Specific Goal (Individualized)  Outcome: No Change  Goal: Absence of Hospital-Acquired Illness or Injury  Outcome: No Change  Goal: Optimal Comfort and Wellbeing  Outcome: No Change  Goal: Readiness for Transition of Care  Outcome: No Change     Problem: Suicidal Behavior  Goal: Suicidal Behavior is Absent or Managed  Outcome: No Change     Problem: Behavioral Health Plan of Care  Goal: Plan of Care Review  Outcome: No Change  Goal: Patient-Specific Goal (Individualization)  Outcome: No Change  Goal: Adheres to Safety Considerations for Self and Others  Outcome: No Change  Goal: Absence of New-Onset Illness or Injury  Outcome: No Change  Goal: Optimized Coping Skills in Response to Life Stressors  Outcome: No Change  Goal: Develops/Participates in Therapeutic [place] to Support Successful Transition  Outcome: No Change     Problem: Suicidal Behavior  Goal: Suicidal Behavior is Absent or Managed  Outcome: No Change

## 2021-07-18 NOTE — PLAN OF CARE
"Pt was visible in the milieu but withdrawn. Pt was on the phone a lot and sat in the lounge for brief moments. Pt took a shower this evening. Presented with a flat affect. Pt denies SI/SIB/AH/VH. Pt says her mood is \"good\". Pt ate dinner and was medication compliant.    Problem: Adult Inpatient Plan of Care  Goal: Optimal Comfort and Wellbeing  Outcome: Improving     Problem: Suicidal Behavior  Goal: Suicidal Behavior is Absent or Managed  Outcome: Improving     Problem: Behavioral Health Plan of Care  Goal: Adheres to Safety Considerations for Self and Others  Outcome: Improving     "

## 2021-07-19 LAB — DEPRECATED CALCIDIOL+CALCIFEROL SERPL-MC: 23 UG/L (ref 20–75)

## 2021-07-19 PROCEDURE — 124N000002 HC R&B MH UMMC

## 2021-07-19 PROCEDURE — 250N000013 HC RX MED GY IP 250 OP 250 PS 637: Performed by: STUDENT IN AN ORGANIZED HEALTH CARE EDUCATION/TRAINING PROGRAM

## 2021-07-19 RX ORDER — LANOLIN ALCOHOL/MO/W.PET/CERES
3 CREAM (GRAM) TOPICAL
Status: DISCONTINUED | OUTPATIENT
Start: 2021-07-19 | End: 2021-07-29 | Stop reason: HOSPADM

## 2021-07-19 RX ADMIN — OLANZAPINE 10 MG: 10 TABLET ORAL at 14:41

## 2021-07-19 RX ADMIN — MELATONIN TAB 3 MG 3 MG: 3 TAB at 23:48

## 2021-07-19 RX ADMIN — OLANZAPINE 15 MG: 10 TABLET, ORALLY DISINTEGRATING ORAL at 23:48

## 2021-07-19 ASSESSMENT — ACTIVITIES OF DAILY LIVING (ADL)
HYGIENE/GROOMING: INDEPENDENT
ORAL_HYGIENE: INDEPENDENT
DRESS: INDEPENDENT

## 2021-07-19 NOTE — PLAN OF CARE
Problem: Adult Inpatient Plan of Care  Goal: Optimal Comfort and Wellbeing  Outcome: No Change  Intervention: Provide Person-Centered Care  Recent Flowsheet Documentation  Taken 7/18/2021 1900 by Brandon Mueller RN  Trust Relationship/Rapport:   care explained   choices provided   emotional support provided   empathic listening provided   questions answered   questions encouraged   reassurance provided   thoughts/feelings acknowledged    Behavioral  Pt was visible in the milieu and was seen making calls where she appeared to be arguing with the person on the other end. Pt denies all mental health symptoms and says she is bored and wants to go home. Pt mom did call and said to the writer that Pt has reported to her that she was hearing the Psychiatrist voices calling her and saying mean things. She however denied saying that when she talked to the mother this evening but when reminded she said that she said the psychiatrist said the mean things but she is not better and ready go go home. Pt's mom is worried that the patient will deny hearing voices which she deals with at home just to be let go. Writer will update the team in report. Pt denies SI thoughts or AVH. Pt endorse good appetite.  Medical  None  Plan  Continue to monitor

## 2021-07-19 NOTE — PLAN OF CARE
Problem: Behavioral Health Plan of Care  Goal: Patient-Specific Goal (Individualization)  Flowsheets (Taken 7/19/2021 0173)  Patient Vulnerabilities:   housing insecurity   substance abuse/addiction   limited social skills   history of unsuccessful treatment   poor impulse control  Patient Personal Strengths:   community support   family/social support  Note:   Personal Plan of Care    Patient goals:  Unable to state goals       Plan for admission:  1. stabilization of mood disorder symptoms.   2. Absence of SI/Safe with self  3. Medication management per Mds  4. Coordination of Care with outpatient providers/family  5. Psychiatric follow up care in place    For CD:    Monitor for symptoms of withdrawal  Complete CD assessment/plan for sobriety

## 2021-07-19 NOTE — PLAN OF CARE
BEHAVIORAL TEAM DISCUSSION    Participants: Dr. Sharp, Psychiatry Resident Dr. Lorene Brown. Laura Duke RN, Brea Solares [facility]  Progress: Initial assessment   Anticipated length of stay: 5-7 days  Continued Stay Criteria/Rationale: HA and aggressive behavior. Needs clinical stabilization and medication management.   Medical/Physical: See H&P note   Precautions:   Behavioral Orders   Procedures    Assault precautions    Code 1 - Restrict to Unit    Discontinue 1:1 attendant for suicide risk     Order Specific Question:   I have performed an in person assessment of the patient     Answer:   Based on this assessment the patient no longer requires a one on one attendant at this point in time.    Routine Programming     As clinically indicated    Self Injury Precaution    Status 15     Every 15 minutes.    Suicide precautions     Patients on Suicide Precautions should have a Combination Diet ordered that includes a Diet selection(s) AND a Behavioral Tray selection for Safe Tray - with utensils, or Safe Tray - NO utensils       Plan: Patient will have ongoing psychiatric assessment. Medications will be reviewed and adjusted per MD as indicated. Outpatient providers will be contacted for care coordination. CTC will continue to assess needs and  ensure appropriate follow up care is in place.   Rationale for change in precautions or plan: None

## 2021-07-19 NOTE — PLAN OF CARE
Pt appears to have slept for  5.75 hours. No PRNs or snacks given; no concerns noted this shift. Will continue to monitor and offer support.        Problem: Suicidal Behavior  Goal: Suicidal Behavior is Absent or Managed  Outcome: Improving

## 2021-07-19 NOTE — PROGRESS NOTES
----------------------------------------------------------------------------------------------------------  [facility]  Psychiatric Progress Note  Hospital Day #3     Subjective     The patient's care was discussed with the treatment team and chart notes were reviewed.     Vitals: VSS  Sleep:  5.75 hours (07/19/21 0700)  Prescribed Medications: Taken as prescribed. Olanzapine 5 mg nightly. Atomoxetine 40 mg in the morning.   PRN Medications: acetaminophen, alum & mag hydroxide-simethicone, hydrOXYzine, melatonin, OLANZapine **OR** OLANZapine, polyethylene glycol     Per Staff Notes:  Over the weekend, the patient refused her depakote so it was discontinued by the on call resident. She was withdrawn for most of the weekend and at one point screamed at the staff due to her blinds being open during a morning check. She intermittently refused meals.     Per Interview:   When we arrived to Erika's room she was sleeping. During the interview, she denied hearing voices or whispers. She said she was feeling low due to being here. When asked if she knew why she was here, she said to get her medications sorted out. She asked multiple times about when she could be discharged. We discussed increasing her olanzapine and she was agreeable to this. She is not having symptoms from her olanzapine. We asked if we could get an EKG and she declined but was not able to give a reason. We discussed discontinuing her atomoxetine and she was agreeable.  I talked with her mother who approved her medication changes and agreed that Erika could return home at the time of discharge.     The risks, benefits, alternatives, and side effects of treatments including no treatment have been discussed and are understood by the patient and other caregivers.    Review of systems:  Complete psychiatric ROS is negative unless otherwise noted above.      Objective     /81 (BP Location: Left arm)   Pulse 91   Temp 97.9  " F (36.6  C) (Oral)   Resp 16   Ht 1.702 m (5' 7\")   Wt 130.4 kg (287 lb 8 oz)   LMP  (LMP Unknown)   SpO2 97%   BMI 45.03 kg/m      Weight is 287 lbs 8 oz  Body mass index is 45.03 kg/m .    Psychiatric Examination:  Appearance:  poorly groomed and fatigued  Muscle Strength and Tone: normal  Gait and Station: Normal  Behavior (Psychomotor):  no evidence of tardive dyskinesia, dystonia, or tics  Eye Contact:  fair  Speech:  clear, coherent  Mood:  depressed  Affect:  intensity is flat and guarded  Attitude:  guarded  Thought Process:  goal oriented  Thought Content:  no evidence of suicidal ideation or homicidal ideation  Associations:  no loose associations  Insight:  limited  Judgment:  limited  Oriented to:  time, person, and place  Attention Span and Concentration:  limited  Recent and Remote Memory:  limited  Language: Fluent in English with appropriate syntax and vocabulary.  Fund of Knowledge: appropriate      No results found for this or any previous visit (from the past 24 hour(s)).     Assessment & Plan      Erika Claudio a 25 year old female with a past psychiatric history of schizoaffective disorder,  intellectual disability, PTSD, ADHD, and substance use disorder (amphetamine) who presented to the ED with out of control behaviors in the context of possible hallucinations and medication non-adherence. Significant symptoms include SI, aggression and impulsive. Her last psychiatric hospitalization was in 9/2020.  She is currently followed by Dr. Amador at [facility]. Current psychosocial stressors include family dynamics which she has been coping with by acting out to others.  Patient's support system includes family and county.  Substance use does not appear to be playing a contributing role in the patient's presentation.  There is genetic loading for CD. Medical history does not appear to be significant.  Psychologically has some reported issues with her mood, particularly related to " fatigue. Social supports include CM and pt's mother; previously in a group home but currently living with mother and brother pending a new group home. The MSE is notable for poor insight into current illness; description of symptoms consistent with AH but denies categorizing them as such. She denies recent self injurious behaviors. Her reported symptoms of agression and possible hallucinations are consistent with her historic diagnosis of schizoaffective disorder vs undifferentiated psychotic disorder. Patient denying symptoms of catalina, but unclear about reliability as a historian. Additionally, she has an intellectual disability which interfere with her ability to adhere with the treatment plan.  Optimization of medications to target these symptom clusters in addition to evaluation of adquate outpatient supports will be targets for treatment during this admission.     Erika Claudio continues to meet criteria for ongoing inpatient hospitalization given reported SI, out of control behaviors and psychosis.    Diagnostic Impression:    The patient's presentation is consistent with Schizoaffective Disorder vs Undifferentiated Psychosis.    Today's Changes  - increase Olanzapine to 15mg at night  - discontinue Atomoxetine until discharge   - Try for EKG tomorrow, patient declined today     Hospital Course:   Erika Claudio was admitted to station 20 with attending Henry Sharp on commitment and will be treated in the therapeutic milieu with appropriate individual and group therapies.  PTA medications depakote, seroquel, atomoxitine, and hydroxyzine.     Psychiatric diagnoses:   # Schizoaffective Disorder  # ADHD  # PTSD  # History of Methamphetamine Use   Medications  - Olanzapine 15 mg nightly  -PRN: acetaminophen, alum & mag hydroxide-simethicone, hydrOXYzine, melatonin, OLANZapine **OR** OLANZapine, polyethylene glycol     # Discontinued Medications (& Rationale):  Seroquel, took her off this and restarted  olanzapine per mom (guardian's) request  Depakote, patient does not what to take, levels suggest non-adherence at home   Hold Atomoxetine 40 mg while inpatient     Medical course:   Patient was medically cleared for admission to inpatient unit. No PTA medications.      Consults: None    Lab/Imagin21: COVID negative. Valproate subtherapeutic. UDS negative. 21: CMP with low albumin (2.8), Lipids, TSH, Folate, B12 normal.    Legal Status: Committed    Disposition: TBD, pending clinical stabilization, medication optimization, and formulation of a safe discharge plan.     Safety Assessment:   Behavioral Orders   Procedures     Assault precautions     Code 1 - Restrict to Unit     Discontinue 1:1 attendant for suicide risk     Order Specific Question:   I have performed an in person assessment of the patient     Answer:   Based on this assessment the patient no longer requires a one on one attendant at this point in time.     Routine Programming     As clinically indicated     Self Injury Precaution     Status 15     Every 15 minutes.     Suicide precautions     Patients on Suicide Precautions should have a Combination Diet ordered that includes a Diet selection(s) AND a Behavioral Tray selection for Safe Tray - with utensils, or Safe Tray - NO utensils          Patient seen and discussed with my attending physician, Dr. Henry Sharp MD who is in agreement with my assessment and plan.    Nae Crane  Medical Student, 3rd Year    Lorene Brown MD  PGY-2 Psychiatry Resident    This patient has been seen and evaluated by me, Henry Sharp.  I have discussed this patient with the psychiatry resident and I agree with the findings and plan in this note.    I have reviewed today's vital signs, medications, labs and imaging.     Henry Luna MD on 2021 at 9:46 AM

## 2021-07-19 NOTE — PLAN OF CARE
"  Problem: Behavioral Health Plan of Care  Goal: Plan of Care Review  Recent Flowsheet Documentation  Taken 7/19/2021 1228 by Maki John RN  Plan of Care Reviewed With: patient  Writer approached pt in her room earlier today and pt yelled \"I already said no\"! Writer explained this was the first interaction we've had and pt yelled \"get out\".   Pt now awake and is calm and pleasant. Pt denies SI/SIB/HI and hallucinations. Pt also denies depression and anxiety. Pt said \"I like it better at home, they said I won't be here too long.\" pt then began to pace the royal waiting for lunch.     After the Dr's talked to pt about stopping one of her medications, straterra, she still kept asking for that medication. Pt was reassured that this medication was discontinued by her Dr but she still kept asking for it.     Pt was in the lounge and started screaming at another pt. This pt said the other pt was \"talking shit behind my back.\" The pt she was referring to is on an SIO and said that pt did not talk to this pt or about her. Pt then went to her room and slammed the door very hard. Pt was told by staff to stay in her room but pt wouldn't listen. Pt then started yelling again about people talking about her. Staff reassured her that nobody is talking about her. Writer asked pt if she was hearing AH, pt denies this but then was heard in her room multiple times talking and yelling to herself.   "

## 2021-07-19 NOTE — PROGRESS NOTES
"   07/19/21 1431   General Information   Date Initially Attended OT 07/19/21     Pt attended <30 mins in a goal setting group - No Charge. Identified \"get on the right meds so I can discharge\" as current goal. OT staff will meet with pt to review the role of occupational therapy and explain the value of having them involved in their treatment plan including options to meet current needs/self-identified goals. As group attendance is established,  continued clinical observations will be made and Pt will be given self-assessment to inform OT initial assessment.    Amina Street, OT on 7/19/2021 at 3:43 PM    "

## 2021-07-19 NOTE — PROGRESS NOTES
EKG Tech/NST Flyer called and talked to Nurse Sanchez to see if this patient was available for EKG to be done. Nurse went to see if patient was awake and reported back to this writer that patient was still sleeping and asked that this writer try again later this afternoon.

## 2021-07-20 PROCEDURE — 250N000013 HC RX MED GY IP 250 OP 250 PS 637: Performed by: STUDENT IN AN ORGANIZED HEALTH CARE EDUCATION/TRAINING PROGRAM

## 2021-07-20 PROCEDURE — H2032 ACTIVITY THERAPY, PER 15 MIN: HCPCS

## 2021-07-20 PROCEDURE — 99232 SBSQ HOSP IP/OBS MODERATE 35: CPT | Mod: GC | Performed by: PSYCHIATRY & NEUROLOGY

## 2021-07-20 PROCEDURE — 90853 GROUP PSYCHOTHERAPY: CPT

## 2021-07-20 PROCEDURE — 124N000002 HC R&B MH UMMC

## 2021-07-20 RX ADMIN — OLANZAPINE 15 MG: 10 TABLET, ORALLY DISINTEGRATING ORAL at 20:44

## 2021-07-20 RX ADMIN — MELATONIN TAB 3 MG 3 MG: 3 TAB at 20:44

## 2021-07-20 ASSESSMENT — ACTIVITIES OF DAILY LIVING (ADL)
HYGIENE/GROOMING: INDEPENDENT
LAUNDRY: WITH SUPERVISION
DRESS: INDEPENDENT;SCRUBS (BEHAVIORAL HEALTH)
ORAL_HYGIENE: INDEPENDENT

## 2021-07-20 NOTE — PLAN OF CARE
"Pt slept for a majority of this shift, she did attend the last group that was offered. Pt stated that she would like to be woken up earlier in the future. She did not have any episodes of agitation today. Pt denies SI/SIB/AH, but appears responding at times and stated that \"I don't want to tell anyone if I'm hearing voices.\" Denies anxiety and depression.   "

## 2021-07-20 NOTE — PLAN OF CARE
Problem: Adult Inpatient Plan of Care  Goal: Plan of Care Review  Outcome: No Change  Flowsheets (Taken 7/20/2021 1029)  Plan of Care Reviewed With: patient    Pt presented with a blunted affect and has been withdrawn and has been irritable. Pt took a shower and staff showed writer a ripped up towel in the linen laundry basket wrapped around a pair of pants. Writer asked pt if she had done that to which pt denied. Pt states she is upset because she is here. Pt denied SI/SIB or hallucinations. Pt has been in the milieu watching movies, paced the halls and participated in group.

## 2021-07-20 NOTE — PLAN OF CARE
"  Problem: Adult Inpatient Plan of Care  Goal: Plan of Care Review  Outcome: No Change     Problem: Adult Inpatient Plan of Care  Goal: Patient-Specific Goal (Individualized)  Outcome: No Change     Problem: Adult Inpatient Plan of Care  Goal: Absence of Hospital-Acquired Illness or Injury  Outcome: No Change     Problem: Adult Inpatient Plan of Care  Goal: Absence of Hospital-Acquired Illness or Injury  Outcome: No Change   Pt had snack early in the afternoon. She was noted to be making phone calls. Then she paced the halls several times.  Presented with an irritable mood. Pt was offered socks when she was noted walking barefoot after a while, responded back \" I wanna f.. leave. I hate this place... this is what you want me to say...\".  Then she continuously paced the halls making some loud noises, pushing the chair by the telephone. Another pt was awakened by the noise, got irritated, said some words to her to which she responded back yelling. Staff intervened. Pt was then asked to go to her room until she calmed down, to which she obliged. Came out a little later, still irritable and disorganized. Paranoid about staff singling her out and not giving her tray right away. Made some paranoid statements saying to other patient \" If you got anything to say about me, say it in my face\".  Pt complained that her tray was  Different from what she ordered, but was corrected when the menu was sent  Back. Other patients were irritated  with her intimidating and aggressive behavior. Pt went straight back to bed and slept. Will continue to monitor.    "

## 2021-07-20 NOTE — PLAN OF CARE
Assessment/Intervention/Current Symptoms and Care Coordination: Met with team. Reviewed patient's chart and progress notes. Writer spoke with patient's CM, Dougie, [phone]. Writer scheduled a meeting between CM and patient at 2:00 PM. Writer called patient's Mother/Guardian, Magnolia at [phone]. Writer provided update on patient's progress. Magnolia confirmed that patient cannot return home at this moment and that there is a plan to transitioned patient into a new group home but that can take some time. Magnolia would like patient to discharge to a mental health residential facility when stable.         Discharge Plan or Goal: Potential IRTS placement.          Barriers to Discharge: Irritable and aggressive. Patient is committed. Needs clinical stabilization and medication management.            Referral Status:         Legal Status: Committed and has legal guardian.

## 2021-07-20 NOTE — PLAN OF CARE
"Music Therapy Group note    Total time in session: 25 minutes    Number of patients in group: 3    Scope of service: psychodynamic     Patient progress: improving slightly - more expressive    Intervention: Music Preferences Assessment     Goal of group: to assess and treat through music     Patient response/reaction to treatment intervention(s): Erika came into group partway through. She appeared dazed and glazed.  She did brighten upon open approach.  Wandered in and out of group several times.      She became expressive talking about how \"I don't pray, but I prayed to leave here!\"  Stated she prayed \"Get me outta here!\"  Her and peer talked about how \"you have to control your emotions to get out of here\" (said by peer), to which Erika said, \"You have to control your actions, you can't control your emotions\" and peer said \"You have to control the way you express your emotions\" and Erika stated they were right, after peer gave an example of options to punch a pillow or get ice when you are mad instead of punching somebody.      Erika was calm, but appearing in her own world at times during group. She chose a song by Dotty Del Rio that she liked, which indicated both normal pop interest, but also possible trauma.  She was calm throughout.     Jeanine Barriga, [facility]  Board-Certified Music Therapist           "

## 2021-07-20 NOTE — PROGRESS NOTES
"    ----------------------------------------------------------------------------------------------------------  [facility]  Psychiatric Progress Note  Hospital Day #4     Subjective     The patient's care was discussed with the treatment team and chart notes were reviewed.     Vitals: VSS  Sleep:  5.75 hours (07/19/21 0700)  Prescribed Medications: Taken as prescribed. Olanzapine 5 mg nightly. Atomoxetine 40 mg in the morning.   PRN Medications: acetaminophen, alum & mag hydroxide-simethicone, hydrOXYzine, OLANZapine **OR** OLANZapine, polyethylene glycol     Per Staff Notes:  Throughout the shift yesterday, Erika had numerous outbursts that were aggressive in nature. Per note \"Pt was in the lounge and started screaming at another pt. This pt said the other pt was \"talking shit behind my back.\" The pt she was referring to is on an SIO and said that pt did not talk to this pt or about her. Pt then went to her room and slammed the door very hard. Pt was told by staff to stay in her room but pt wouldn't listen. Pt then started yelling again about people talking about her. Staff reassured her that nobody is talking about her. Writer asked pt if she was hearing AH, pt denies this but then was heard in her room multiple times talking and yelling to herself. \" In a later note: \" Paranoid about staff singling her out and not giving her tray right away. Made some paranoid statements saying to other patient \" If you got anything to say about me, say it in my face\".  Pt complained that her tray was  Different from what she ordered, but was corrected when the menu was sent  Back. Other patients were irritated  with her intimidating and aggressive behavior.\"     Per Interview:   In the interview, Erika was initially sleeping but sat up to talk with us. She asked about going home and we explained that her mother would prefer she go to an IRTS. She was very against this. We encouraged her to talk with " "her mother about it. We discussed medication and she asked why she can't take the olanzapine PRN. We talked about how it will work better if she is taking it every day. We asked about her perception that people are talking behind her back. She seemed to have more insight into this than previously but would not share what they were saying. She again asked about leaving, stating that there are other people waiting for beds. We broached the topic of potentially starting metformin but she was unable to follow the discussion so we did not continue it. She requested that she be woken up earlier in the morning and inquired about her strattera. We reminded her that we discontinued it while she is here.     The risks, benefits, alternatives, and side effects of treatments including no treatment have been discussed and are understood by the patient and other caregivers.    Review of systems:  Complete psychiatric ROS is negative unless otherwise noted above.      Objective     /66 (BP Location: Right arm)   Pulse 90   Temp 97.7  F (36.5  C) (Oral)   Resp 16   Ht 1.702 m (5' 7\")   Wt 130.4 kg (287 lb 8 oz)   LMP  (LMP Unknown)   SpO2 97%   BMI 45.03 kg/m      Weight is 287 lbs 8 oz  Body mass index is 45.03 kg/m .    Psychiatric Examination:  Appearance:  poorly groomed and fatigued  Muscle Strength and Tone: normal  Gait and Station: Normal  Behavior (Psychomotor):  no evidence of tardive dyskinesia, dystonia, or tics  Eye Contact:  fair  Speech:  clear, coherent  Mood:  depressed  Affect:  intensity is flat and guarded  Attitude:  guarded  Thought Process:  disorganized  Thought Content:  no evidence of suicidal ideation or homicidal ideation paranoid delusions, auditory hallucinations  Associations:  no loose associations  Insight:  limited  Judgment:  limited  Oriented to:  time, person, and place  Attention Span and Concentration:  limited  Recent and Remote Memory:  limited  Language: Fluent in English with " appropriate syntax and vocabulary.  Fund of Knowledge: appropriate      No results found for this or any previous visit (from the past 24 hour(s)).     Assessment & Plan      Erika Claudio a 25 year old female with a past psychiatric history of schizoaffective disorder,  intellectual disability, PTSD, ADHD, and substance use disorder (amphetamine) who presented to the ED with out of control behaviors in the context of possible hallucinations and medication non-adherence. Significant symptoms include SI, aggression and impulsive. Her last psychiatric hospitalization was in 9/2020.  She is currently followed by Dr. Amador at [facility]. Current psychosocial stressors include family dynamics which she has been coping with by acting out to others.  Patient's support system includes family and [facility].  Substance use does not appear to be playing a contributing role in the patient's presentation.  There is genetic loading for CD. Medical history does not appear to be significant.  Psychologically has some reported issues with her mood, particularly related to fatigue. Social supports include CM and pt's mother; previously in a group home but currently living with mother and brother pending a new group home. The MSE is notable for poor insight into current illness; description of symptoms consistent with AH but denies categorizing them as such. She denies recent self injurious behaviors. Her reported symptoms of agression and possible hallucinations are consistent with her historic diagnosis of schizoaffective disorder vs undifferentiated psychotic disorder. Patient denying symptoms of catalina, but unclear about reliability as a historian. Additionally, she has an intellectual disability which interfere with her ability to adhere with the treatment plan.  Optimization of medications to target these symptom clusters in addition to evaluation of adquate outpatient supports will be targets for treatment during this  admission.     Erika Claudio continues to meet criteria for ongoing inpatient hospitalization given reported SI, out of control behaviors and psychosis.    Diagnostic Impression:    The patient's presentation is consistent with Schizoaffective Disorder vs Undifferentiated Psychosis.    Today's Changes  None     Hospital Course:   Erika Claudio was admitted to station 20 with attending Henry Sharp on commitment and will be treated in the therapeutic milieu with appropriate individual and group therapies.  PTA medications depakote, seroquel, atomoxitine, and hydroxyzine.     Psychiatric diagnoses:   # Schizoaffective Disorder  # ADHD  # PTSD  # History of Methamphetamine Use   Medications  - Olanzapine 15 mg nightly  -PRN: acetaminophen, alum & mag hydroxide-simethicone, hydrOXYzine, melatonin, OLANZapine **OR** OLANZapine, polyethylene glycol     # Discontinued Medications (& Rationale):  Seroquel, took her off this and restarted olanzapine per mom (guardian's) request  Depakote, patient does not what to take, levels suggest non-adherence at home   Hold Atomoxetine 40 mg while inpatient     Medical course:   Patient was medically cleared for admission to inpatient unit. No PTA medications.      Consults: None    Lab/Imagin21: COVID negative. Valproate subtherapeutic. UDS negative. 21: CMP with low albumin (2.8), Lipids, TSH, Folate, B12 normal.    Legal Status: Committed    Disposition: TBD, pending clinical stabilization, medication optimization, and formulation of a safe discharge plan.     Safety Assessment:   Behavioral Orders   Procedures     Assault precautions     Code 1 - Restrict to Unit     Discontinue 1:1 attendant for suicide risk     Order Specific Question:   I have performed an in person assessment of the patient     Answer:   Based on this assessment the patient no longer requires a one on one attendant at this point in time.     Routine Programming     As clinically  indicated     Self Injury Precaution     Status 15     Every 15 minutes.     Suicide precautions     Patients on Suicide Precautions should have a Combination Diet ordered that includes a Diet selection(s) AND a Behavioral Tray selection for Safe Tray - with utensils, or Safe Tray - NO utensils          Patient seen and discussed with my attending physician, Dr. Henry Sharp MD who is in agreement with my assessment and plan.    Nae Crane  Medical Student, 3rd Year    Lorene Brown MD  PGY-2 Psychiatry Resident      This patient has been seen and evaluated by me, Henry Sharp.  I have discussed this patient with the psychiatry resident and I agree with the findings and plan in this note.    I have reviewed today's vital signs, medications, labs and imaging.     Henry Luna MD on 7/20/2021 at 10:19 PM

## 2021-07-20 NOTE — PLAN OF CARE
Pt slept for a total of 6 hours during this overnight shift. No PRN medications were administered and no concerns were noted.    Problem: Suicidal Behavior  Goal: Suicidal Behavior is Absent or Managed  Outcome: Improving

## 2021-07-21 PROCEDURE — 90853 GROUP PSYCHOTHERAPY: CPT

## 2021-07-21 PROCEDURE — 99232 SBSQ HOSP IP/OBS MODERATE 35: CPT | Mod: GC | Performed by: PSYCHIATRY & NEUROLOGY

## 2021-07-21 PROCEDURE — 124N000002 HC R&B MH UMMC

## 2021-07-21 PROCEDURE — G0177 OPPS/PHP; TRAIN & EDUC SERV: HCPCS

## 2021-07-21 PROCEDURE — 93005 ELECTROCARDIOGRAM TRACING: CPT | Mod: 76

## 2021-07-21 PROCEDURE — 250N000013 HC RX MED GY IP 250 OP 250 PS 637: Performed by: STUDENT IN AN ORGANIZED HEALTH CARE EDUCATION/TRAINING PROGRAM

## 2021-07-21 PROCEDURE — 93010 ELECTROCARDIOGRAM REPORT: CPT | Performed by: INTERNAL MEDICINE

## 2021-07-21 RX ORDER — OLANZAPINE 20 MG/1
20 TABLET, ORALLY DISINTEGRATING ORAL AT BEDTIME
Status: DISCONTINUED | OUTPATIENT
Start: 2021-07-21 | End: 2021-07-22

## 2021-07-21 RX ADMIN — MELATONIN TAB 3 MG 3 MG: 3 TAB at 20:41

## 2021-07-21 RX ADMIN — OLANZAPINE 20 MG: 20 TABLET, ORALLY DISINTEGRATING ORAL at 20:41

## 2021-07-21 ASSESSMENT — ACTIVITIES OF DAILY LIVING (ADL)
LAUNDRY: WITH SUPERVISION
HYGIENE/GROOMING: INDEPENDENT
ORAL_HYGIENE: INDEPENDENT
DRESS: SCRUBS (BEHAVIORAL HEALTH);INDEPENDENT

## 2021-07-21 NOTE — PROGRESS NOTES
Number of patients attending the group:  4  Group Length:  0.5 Hour    Group Therapy   Summary of Group / Topics Discussed:  The psychotherapy group goal is to promote insight to positive choice and change. Group processing is within a supportive and safe environment. Patients processed emotions using verbal group and expressive psychotherapy interventions.  This group focused on debriefing patients on an incident that happened on the unit. Patients used this time to process their thoughts and used feedback to practice coping strategies.    Assessment: This patient participated in the group. She presented with a flat affect. Pt engaged in the group in the context of listening and asking participants to explain who they are, what they are working on and how they plan to cope with the rest of the night, following the debriefing.    Patient Response: Pt engaged in the group. Reported willingness to practice positive coping skills.

## 2021-07-21 NOTE — PLAN OF CARE
Problem: OT General Care Plan  Goal: OT Goal 1  Description: Erika will demonstrate consistent engagement in OT group activities that support recovery as evidenced by participating in >1 scheduled OT group/day for 5 days.     Date of Service: July 21, 2021    Description: Erika attended 1 occupational therapy group today.   1:15 - 2:00. 4 total participants. Topic group for mental health management: 'Tree of Life' focusing on insight development via metaphors: identification of various past, present, and future aspects of life that do/don't serve them throughout recovery. Pt Response: Overall calm even demeanor. Demonstrated poor frustration tolerance r/t worksheet directions. IND with concrete topics. Required min-mod assist to apply abstract concepts to self. Identified personal value of loyalty and friendship, support from her mom and cats, and hopes for the future (I.e. to be happy, keep her mom in her life, get on correct medications, and become a mother).    Assessment: Demonstrates benefit from engagement in OT groups that support healthy recovery, specifically exploration of positive coping skills for symptom management, relapse prevention, and resumption of personal roles, routines and daily occupations.    Plan: Continue graded occupation-based activities for increased success towards goal and ongoing assessment.     Amina Street, OT on 7/21/2021 at 2:30 PM      Outcome: Improving

## 2021-07-21 NOTE — PROGRESS NOTES
"    ----------------------------------------------------------------------------------------------------------  [facility]  Psychiatric Progress Note  Hospital Day #5     Subjective     The patient's care was discussed with the treatment team and chart notes were reviewed.     Vitals: VSS  Sleep:  5.75 hours (07/19/21 0700)  Prescribed Medications: Taken as prescribed. Olanzapine 5 mg nightly. Atomoxetine 40 mg in the morning.   PRN Medications: acetaminophen, alum & mag hydroxide-simethicone, hydrOXYzine, OLANZapine **OR** OLANZapine, polyethylene glycol     Per Staff Notes:  Erika attended a group yesterday and was noted to be more expressive. She told the nurses she would like to wake up earlier. She admitted that she does not want to tell people if she is hearing  voices. Later she took a shower and may have ripped up the towel (unclear).     Per Interview:   In the interview, Erika was initially asleep but woke up and talked with us. She said she is not hearing people whisper about her. She is eager to go home and would like to go to her mom's. We told her we would touch base about this. We discussed increasing her medications and getting an EKG, she was agreeable to this.     The risks, benefits, alternatives, and side effects of treatments including no treatment have been discussed and are understood by the patient and other caregivers.    Review of systems:  Complete psychiatric ROS is negative unless otherwise noted above.      Objective     /66 (BP Location: Right arm)   Pulse 90   Temp 97.7  F (36.5  C) (Oral)   Resp 16   Ht 1.702 m (5' 7\")   Wt 130.4 kg (287 lb 8 oz)   LMP  (LMP Unknown)   SpO2 97%   BMI 45.03 kg/m      Weight is 287 lbs 8 oz  Body mass index is 45.03 kg/m .    Psychiatric Examination:  Appearance:  poorly groomed and fatigued  Muscle Strength and Tone: normal  Gait and Station: Normal  Behavior (Psychomotor):  no evidence of tardive dyskinesia, " dystonia, or tics. Fidgets and looks around during interview in a way that suggests hypervigilance  Eye Contact:  fair  Speech:  clear, coherent  Mood:  depressed  Affect:  intensity is flat and guarded  Attitude:  guarded  Thought Process:  disorganized  Thought Content:  no evidence of suicidal ideation or homicidal ideation. Positive for paranoid delusions, auditory hallucinations  Associations:  no loose associations  Insight:  limited  Judgment:  limited  Oriented to:  time, person, and place  Attention Span and Concentration:  limited  Recent and Remote Memory:  limited  Language: Fluent in English with appropriate syntax and vocabulary.  Fund of Knowledge: appropriate      No results found for this or any previous visit (from the past 24 hour(s)).     Assessment & Plan      Erika Claudio a 25 year old female with a past psychiatric history of schizoaffective disorder,  intellectual disability, PTSD, ADHD, and substance use disorder (amphetamine) who presented to the ED with out of control behaviors in the context of possible hallucinations and medication non-adherence. Significant symptoms include SI, aggression and impulsive. Her last psychiatric hospitalization was in 9/2020.  She is currently followed by Dr. Amador at [facility]. Current psychosocial stressors include family dynamics which she has been coping with by acting out to others.  Patient's support system includes family and county.  Substance use does not appear to be playing a contributing role in the patient's presentation.  There is genetic loading for CD. Medical history does not appear to be significant.  Psychologically has some reported issues with her mood, particularly related to fatigue. Social supports include CM and pt's mother; previously in a group home but currently living with mother and brother pending a new group home. The MSE is notable for poor insight into current illness; description of symptoms consistent with AH but  denies categorizing them as such. She denies recent self injurious behaviors. Her reported symptoms of agression and possible hallucinations are consistent with her historic diagnosis of schizoaffective disorder vs undifferentiated psychotic disorder. Patient denying symptoms of catalina, but unclear about reliability as a historian. Additionally, she has an intellectual disability which interfere with her ability to adhere with the treatment plan.  Optimization of medications to target these symptom clusters in addition to evaluation of adquate outpatient supports will be targets for treatment during this admission.     Erika Claudio continues to meet criteria for ongoing inpatient hospitalization given reported SI, out of control behaviors and psychosis.    Diagnostic Impression:    The patient's presentation is consistent with Schizoaffective Disorder vs Undifferentiated Psychosis.    Today's Changes  - increase olanzapine to 20mg   - EKG    Hospital Course:   Erika Claudio was admitted to station 20 with attending Henry Sharp on commitment and will be treated in the therapeutic milieu with appropriate individual and group therapies.  PTA medications depakote, seroquel, atomoxitine, and hydroxyzine.     Psychiatric diagnoses:   # Schizoaffective Disorder  # ADHD  # PTSD  # History of Methamphetamine Use   Medications  - Olanzapine 20 mg nightly  -PRN: acetaminophen, alum & mag hydroxide-simethicone, hydrOXYzine, melatonin, OLANZapine **OR** OLANZapine, polyethylene glycol     # Discontinued Medications (& Rationale):  Seroquel, took her off this and restarted olanzapine per mom (guardian's) request  Depakote, patient does not what to take, levels suggest non-adherence at home   Hold Atomoxetine 40 mg while inpatient     Medical course:   Patient was medically cleared for admission to inpatient unit. No PTA medications.      Consults: None    Lab/Imagin21: COVID negative. Valproate  subtherapeutic. UDS negative. 7/17/21: CMP with low albumin (2.8), Lipids, TSH, Folate, B12 normal.    Legal Status: Committed    Disposition: TBD, pending clinical stabilization, medication optimization, and formulation of a safe discharge plan.     Safety Assessment:   Behavioral Orders   Procedures     Assault precautions     Code 1 - Restrict to Unit     Discontinue 1:1 attendant for suicide risk     Order Specific Question:   I have performed an in person assessment of the patient     Answer:   Based on this assessment the patient no longer requires a one on one attendant at this point in time.     Routine Programming     As clinically indicated     Self Injury Precaution     Status 15     Every 15 minutes.     Suicide precautions     Patients on Suicide Precautions should have a Combination Diet ordered that includes a Diet selection(s) AND a Behavioral Tray selection for Safe Tray - with utensils, or Safe Tray - NO utensils          Patient seen and discussed with my attending physician, Dr. Henry Sharp MD who is in agreement with my assessment and plan.    Nae Crane  Medical Student, 3rd Year    Lorene Brown MD  PGY-2 Psychiatry Resident    This patient has been seen and evaluated by me, Henry Sharp.  I have discussed this patient with the psychiatry resident and I agree with the findings and plan in this note.    I have reviewed today's vital signs, medications, labs and imaging.     Henry Luna MD on 7/22/2021 at 10:31 PM

## 2021-07-21 NOTE — PLAN OF CARE
Problem: Adult Inpatient Plan of Care  Goal: Plan of Care Review  7/21/2021 0106 by Theo Clay, RN  Outcome: No Change  7/20/2021 1810 by Theo Clay, RN  Outcome: No Change  Flowsheets (Taken 7/20/2021 2840)  Plan of Care Reviewed With: patient      Pt slept a total of 6.5 hours. No PRNs were given.

## 2021-07-21 NOTE — PLAN OF CARE
Assessment/Intervention/Current Symptoms and Care Coordination: Met with team. Reviewed patient's chart and progress notes. Writer called patient's JOSE LI Lorne Schultz at [phone]. Writer left  requesting a call back. Received call from JOSE LI Lorne schultz. CW indicated that patient could be discharge to an IRTS facility and that will not interfere with her ability to move into a group home.         Discharge Plan or Goal: Potential IRTS placement or home.             Barriers to Discharge: Irritable and aggressive. Patient is committed. Needs clinical stabilization and medication management.               Referral Status:           Legal Status: Committed and has legal guardian.

## 2021-07-21 NOTE — PLAN OF CARE
Patient had a good shift, visible in milieu.  Flat affect, denies SI/SIB, depression/anxiety.  Paces hallway most of the time this shift.  No aggressive behavior noted.  Will continue to monitor closely.

## 2021-07-22 LAB
ATRIAL RATE - MUSE: 77 BPM
DIASTOLIC BLOOD PRESSURE - MUSE: NORMAL MMHG
HBA1C MFR BLD: 5.2 % (ref 0–5.6)
INTERPRETATION ECG - MUSE: NORMAL
P AXIS - MUSE: 41 DEGREES
PR INTERVAL - MUSE: 142 MS
QRS DURATION - MUSE: 82 MS
QT - MUSE: 366 MS
QTC - MUSE: 414 MS
R AXIS - MUSE: 26 DEGREES
SYSTOLIC BLOOD PRESSURE - MUSE: NORMAL MMHG
T AXIS - MUSE: 34 DEGREES
VENTRICULAR RATE- MUSE: 77 BPM

## 2021-07-22 PROCEDURE — 90853 GROUP PSYCHOTHERAPY: CPT

## 2021-07-22 PROCEDURE — 250N000013 HC RX MED GY IP 250 OP 250 PS 637: Performed by: STUDENT IN AN ORGANIZED HEALTH CARE EDUCATION/TRAINING PROGRAM

## 2021-07-22 PROCEDURE — 99232 SBSQ HOSP IP/OBS MODERATE 35: CPT | Mod: GC | Performed by: PSYCHIATRY & NEUROLOGY

## 2021-07-22 PROCEDURE — 124N000002 HC R&B MH UMMC

## 2021-07-22 RX ADMIN — MELATONIN TAB 3 MG 3 MG: 3 TAB at 20:37

## 2021-07-22 RX ADMIN — OLANZAPINE 30 MG: 20 TABLET, ORALLY DISINTEGRATING ORAL at 20:37

## 2021-07-22 ASSESSMENT — ACTIVITIES OF DAILY LIVING (ADL)
DRESS: INDEPENDENT
ORAL_HYGIENE: INDEPENDENT
HYGIENE/GROOMING: INDEPENDENT
LAUNDRY: WITH SUPERVISION

## 2021-07-22 NOTE — PLAN OF CARE
"Patient has spent majority of the shift in the milieu. Denies suicidal ideation and self injurious thoughts. Denies anxiety and depression. Denies auditory and visual hallucinations. Ate dinner. Med compliant. Patient has been a bit loud and restless. Overall cooperative and pleasant on approach.     Patient evaluation continues. Assessed mood,anxiety,thoughts and behavior.     Patient gradually progressing towards goals.    Patient is encouraged to participate in groups and assisted to develop healthy coping skills.     VS reviewed: /80   Pulse 81   Temp 97.2  F (36.2  C) (Oral)   Resp 16   Ht 1.702 m (5' 7\")   Wt 130.4 kg (287 lb 8 oz)   LMP  (LMP Unknown)   SpO2 96%   BMI 45.03 kg/m      Length of stay: 5    Refer to daily team meeting notes for individualized plan of care. Nursing will continue to assess.    "

## 2021-07-22 NOTE — PROGRESS NOTES
"    ----------------------------------------------------------------------------------------------------------  [facility]  Psychiatric Progress Note  Hospital Day #6     Subjective     The patient's care was discussed with the treatment team and chart notes were reviewed.     Vitals: VSS  Sleep:  7 hours (07/22/21 0600)  Prescribed Medications: Taken as prescribed.   PRN Medications: acetaminophen, alum & mag hydroxide-simethicone, hydrOXYzine, OLANZapine **OR** OLANZapine, polyethylene glycol     Per Staff Notes:  Per notes, the patient has been a bit loud and restless but overall cooperative and pleasant.     Per Interview:   When we arrived to the interview, Erika was sleeping. She woke up and briefly chatted with us. She said things are going well and asked when she can go home. We explained that her care team and mother feel she needs to go to an IRTS first. She was upset about this, but after talking about it at length she seemed more agreeable to it. We will continue to revisit this tomorrow.     The risks, benefits, alternatives, and side effects of treatments including no treatment have been discussed and are understood by the patient and other caregivers.    Review of systems:  Complete psychiatric ROS is negative unless otherwise noted above.      Objective     /80   Pulse 81   Temp 97.2  F (36.2  C) (Oral)   Resp 16   Ht 1.702 m (5' 7\")   Wt 130.4 kg (287 lb 8 oz)   LMP  (LMP Unknown)   SpO2 96%   BMI 45.03 kg/m      Weight is 287 lbs 8 oz  Body mass index is 45.03 kg/m .    Psychiatric Examination:  Appearance:  poorly groomed and fatigued  Muscle Strength and Tone: normal  Gait and Station: Normal  Behavior (Psychomotor):  no evidence of tardive dyskinesia, dystonia, or tics. Fidgets and looks around during interview in a way that suggests hypervigilance  Eye Contact:  fair  Speech:  clear, coherent  Mood:  depressed  Affect:  intensity is flat and " guarded  Attitude:  guarded  Thought Process:  disorganized  Thought Content:  no evidence of suicidal ideation or homicidal ideation. Positive for paranoid delusions, auditory hallucinations  Associations:  no loose associations  Insight:  limited  Judgment:  limited  Oriented to:  time, person, and place  Attention Span and Concentration:  limited  Recent and Remote Memory:  limited  Language: Fluent in English with appropriate syntax and vocabulary.  Fund of Knowledge: appropriate      Recent Results (from the past 24 hour(s))   EKG 12-lead, complete    Collection Time: 07/21/21  2:18 PM   Result Value Ref Range    Systolic Blood Pressure  mmHg    Diastolic Blood Pressure  mmHg    Ventricular Rate 77 BPM    Atrial Rate 77 BPM    OR Interval 142 ms    QRS Duration 82 ms     ms    QTc 414 ms    P Axis 41 degrees    R AXIS 26 degrees    T Axis 34 degrees    Interpretation ECG       Sinus rhythm with sinus arrhythmia  Normal ECG  No previous ECGs available          Assessment & Plan      Erika Claudio a 25 year old female with a past psychiatric history of schizoaffective disorder,  intellectual disability, PTSD, ADHD, and substance use disorder (amphetamine) who presented to the ED with out of control behaviors in the context of possible hallucinations and medication non-adherence. Significant symptoms include SI, aggression and impulsive. Her last psychiatric hospitalization was in 9/2020.  She is currently followed by Dr. Amador at [facility]. Current psychosocial stressors include family dynamics which she has been coping with by acting out to others.  Patient's support system includes [facility] and [facility]  Substance use does not appear to be playing a contributing role in the patient's presentation.  There is genetic loading for CD. Medical history does not appear to be significant.  Psychologically has some reported issues with her mood, particularly related to fatigue. Social supports include  and  pt's mother; previously in a group home but currently living with mother and brother pending a new group home. The MSE is notable for poor insight into current illness; description of symptoms consistent with AH but denies categorizing them as such. She denies recent self injurious behaviors. Her reported symptoms of agression and possible hallucinations are consistent with her historic diagnosis of schizoaffective disorder vs undifferentiated psychotic disorder. Patient denying symptoms of catalina, but unclear about reliability as a historian. Additionally, she has an intellectual disability which interfere with her ability to adhere with the treatment plan.  Optimization of medications to target these symptom clusters in addition to evaluation of adquate outpatient supports will be targets for treatment during this admission.     Erika Claudio continues to meet criteria for ongoing inpatient hospitalization given reported SI, out of control behaviors and psychosis.    Diagnostic Impression:  The patient's presentation is consistent with Schizoaffective Disorder vs Undifferentiated Psychosis.    Today's Changes  -Increase olanzapine to 30 mg nightly    Hospital Course:   Erika Claudio was admitted to station 20 with attending Henry Sharp on commitment and will be treated in the therapeutic milieu with appropriate individual and group therapies.  PTA medications depakote, seroquel, atomoxitine, and hydroxyzine. We discontinued the depakote and seroquel. Atomoxitine was held during admission. She was started on Olanzapine which we increased from 5 to 30 mg over time.     Psychiatric diagnoses:   # Schizoaffective Disorder  # ADHD  # PTSD  # History of Methamphetamine Use   Medications  - Olanzapine 30 mg nightly  -PRN: acetaminophen, alum & mag hydroxide-simethicone, hydrOXYzine, melatonin, OLANZapine **OR** OLANZapine, polyethylene glycol     # Discontinued Medications (& Rationale):  Quetiapine, took her  off this and restarted olanzapine per mom (guardian's) request  Divalproex, patient does not what to take, levels suggest non-adherence at home   Hold Atomoxetine 40 mg while inpatient     Medical course:   Patient was medically cleared for admission to inpatient unit. No PTA medications.      Consults: None    Lab/Imagin21: COVID negative. Valproate subtherapeutic. UDS negative. 21: CMP with low albumin (2.8), Lipids, TSH, Folate, B12 normal. 21 Hgb A1c 5.2.  EKG 21 normal sinus rhythm.     Legal Status: Committed    Disposition: TBD, pending clinical stabilization, medication optimization, and formulation of a safe discharge plan.     Safety Assessment:   Behavioral Orders   Procedures     Assault precautions     Code 1 - Restrict to Unit     Discontinue 1:1 attendant for suicide risk     Order Specific Question:   I have performed an in person assessment of the patient     Answer:   Based on this assessment the patient no longer requires a one on one attendant at this point in time.     Routine Programming     As clinically indicated     Self Injury Precaution     Status 15     Every 15 minutes.     Suicide precautions     Patients on Suicide Precautions should have a Combination Diet ordered that includes a Diet selection(s) AND a Behavioral Tray selection for Safe Tray - with utensils, or Safe Tray - NO utensils          Patient seen and discussed with my attending physician, Dr. Henry Sharp MD who is in agreement with my assessment and plan.    Nae Crane  Medical Student, 3rd Year    Lorene Brown MD  PGY-2 Psychiatry Resident    This patient has been seen and evaluated by me, Henry Sharp.  I have discussed this patient with the psychiatry resident and I agree with the findings and plan in this note.    I have reviewed today's vital signs, medications, labs and imaging.     Henry Luna MD on 2021 at 10:35 PM

## 2021-07-22 NOTE — PLAN OF CARE
Assessment/Intervention/Current Symptoms and Care Coordination: Met with team. Reviewed patient's chart and progress notes. Writer called patient's mother/guardian, Magnolia, to discuss treatment progress and discharge discharge disposition. Magnolia confirmed that patient should be discharged to an IRTS. Writer sent IRTS referrals to several [facility] facilities.      Discharge Plan or Goal: Potential IRTS placement or home.             Barriers to Discharge: Irritable and aggressive. Patient is committed. Needs clinical stabilization and medication management.               Referral Status: Patient was referred to IRTS facilities.            Legal Status: Committed and has legal guardian

## 2021-07-22 NOTE — PLAN OF CARE
Number of patients attending the group:  3  Group Length: 60 minutes    Group Therapy     Summary of Group / Topics Discussed:    Discussed social support system, used circles to indicate levels of intimacy. Discussed traits that would be important to know if they wanted to trust someone more; also discussed what would be important to know if they weren t sure they could trust that person. Discussed why it is important to understand the type of relationship they have with someone and how that impacts boundary setting.     The psychotherapy group goal is to promote insight to positive choice and change. Group processing is within a supportive and safe environment. Patients will process emotions using verbal group and expressive psychotherapy interventions.        Assessment Pt was very quiet throughout group, did participate when asked direct questions and focused on understanding someone's character.

## 2021-07-22 NOTE — PLAN OF CARE
"  Problem: Adult Inpatient Plan of Care  Goal: Plan of Care Review  Outcome: No Change     Problem: Adult Inpatient Plan of Care  Goal: Patient-Specific Goal (Individualized)  Outcome: No Change       Pt refused breakfast and vitals in the morning, woke up irritable at about 12:00 noon. Would not respond to staff who greeted her. Pt had a good lunch. Appetite good. Presented with a flat  affect.  Offered shower and pt accepted. Pt denied SI/SIB/Voices/Pain. When asked about anxiety and depression, pt stated  \"none, other than staying here. I want to be transferred to a different unit\". Will continue to monitor.   "

## 2021-07-22 NOTE — PLAN OF CARE
Erika appeared to sleep a total of 7 hours this night shift.  No prns or snacks given or requested.

## 2021-07-23 PROCEDURE — 250N000013 HC RX MED GY IP 250 OP 250 PS 637: Performed by: STUDENT IN AN ORGANIZED HEALTH CARE EDUCATION/TRAINING PROGRAM

## 2021-07-23 PROCEDURE — G0177 OPPS/PHP; TRAIN & EDUC SERV: HCPCS

## 2021-07-23 PROCEDURE — 99232 SBSQ HOSP IP/OBS MODERATE 35: CPT | Mod: GC | Performed by: PSYCHIATRY & NEUROLOGY

## 2021-07-23 PROCEDURE — 124N000002 HC R&B MH UMMC

## 2021-07-23 RX ADMIN — OLANZAPINE 30 MG: 20 TABLET, ORALLY DISINTEGRATING ORAL at 20:52

## 2021-07-23 RX ADMIN — MELATONIN TAB 3 MG 3 MG: 3 TAB at 20:52

## 2021-07-23 ASSESSMENT — ACTIVITIES OF DAILY LIVING (ADL)
HYGIENE/GROOMING: INDEPENDENT
HYGIENE/GROOMING: INDEPENDENT
ORAL_HYGIENE: INDEPENDENT
DRESS: INDEPENDENT
DRESS: INDEPENDENT
LAUNDRY: WITH SUPERVISION
ORAL_HYGIENE: INDEPENDENT

## 2021-07-23 NOTE — PROGRESS NOTES
"    ----------------------------------------------------------------------------------------------------------  [facility]  Psychiatric Progress Note  Hospital Day #7     Subjective     The patient's care was discussed with the treatment team and chart notes were reviewed.     Vitals: VSS  Sleep:  7 hours (07/23/21 0607)  Prescribed Medications: Taken as prescribed.   PRN Medications: acetaminophen, alum & mag hydroxide-simethicone, hydrOXYzine, OLANZapine **OR** OLANZapine, polyethylene glycol     Per Staff Notes:  Erika has continued to sleep until about noon. She has been enjoying watching movies lately. She did request to be transferred to another unit.     Per Interview:   Erika expressed disappointment that she was not able to go home, we validated these frustrations. We explained that she would begin interviewing with the IRTS next week and she was agreeable. She expressed being bored here. We encouraged her to wake up earlier but she said there is no point because there is nothing to do. She identified her daily goal as being going to group.     The risks, benefits, alternatives, and side effects of treatments including no treatment have been discussed and are understood by the patient and other caregivers.    Review of systems:  Complete psychiatric ROS is negative unless otherwise noted above.      Objective     /81   Pulse 83   Temp 97.5  F (36.4  C) (Oral)   Resp 16   Ht 1.702 m (5' 7\")   Wt 130.4 kg (287 lb 8 oz)   LMP  (LMP Unknown)   SpO2 96%   BMI 45.03 kg/m      Weight is 287 lbs 8 oz  Body mass index is 45.03 kg/m .    Psychiatric Examination:  Appearance:  poorly groomed and fatigued  Muscle Strength and Tone: normal  Gait and Station: Normal  Behavior (Psychomotor):  no evidence of tardive dyskinesia, dystonia, or tics  Eye Contact:  fair  Speech:  clear, coherent  Mood:  depressed  Affect:  intensity is flat and guarded  Attitude:  guarded  Thought " Process:  disorganized  Thought Content:  no evidence of suicidal ideation or homicidal ideation. Positive for paranoid delusions, auditory hallucinations  Associations:  no loose associations  Insight:  limited  Judgment:  limited  Oriented to:  time, person, and place  Attention Span and Concentration:  limited  Recent and Remote Memory:  limited  Language: Fluent in English with appropriate syntax and vocabulary.  Fund of Knowledge: appropriate      No results found for this or any previous visit (from the past 24 hour(s)).     Assessment & Plan      Erika Claudio a 25 year old female with a past psychiatric history of schizoaffective disorder,  intellectual disability, PTSD, ADHD, and substance use disorder (amphetamine) who presented to the ED with out of control behaviors in the context of possible hallucinations and medication non-adherence. Significant symptoms include SI, aggression and impulsive. Her last psychiatric hospitalization was in 9/2020.  She is currently followed by Dr. Amador at [facility]. Current psychosocial stressors include family dynamics which she has been coping with by acting out to others.  Patient's support system includes family and county.  Substance use does not appear to be playing a contributing role in the patient's presentation.  There is genetic loading for CD. Medical history does not appear to be significant.  Psychologically has some reported issues with her mood, particularly related to fatigue. Social supports include CM and pt's mother; previously in a group home but currently living with mother and brother pending a new group home. The MSE is notable for poor insight into current illness; description of symptoms consistent with AH but denies categorizing them as such. She denies recent self injurious behaviors. Her reported symptoms of agression and possible hallucinations are consistent with her historic diagnosis of schizoaffective disorder vs undifferentiated  psychotic disorder. Patient denying symptoms of catalina, but unclear about reliability as a historian. Additionally, she has an intellectual disability which interfere with her ability to adhere with the treatment plan.  Optimization of medications to target these symptom clusters in addition to evaluation of adquate outpatient supports will be targets for treatment during this admission.     Erika Claudio continues to meet criteria for ongoing inpatient hospitalization given reported SI, out of control behaviors and psychosis.    Diagnostic Impression:  The patient's presentation is consistent with Schizoaffective Disorder vs Undifferentiated Psychosis.    Today's Changes  None    Hospital Course:   Erika Claudio was admitted to station 20 with attending Henry Sharp on commitment and will be treated in the therapeutic milieu with appropriate individual and group therapies.  PTA medications depakote, seroquel, atomoxitine, and hydroxyzine. We discontinued the depakote and seroquel. Atomoxitine was held during admission. She was started on Olanzapine which we increased from 5 to 30 mg over time.     Psychiatric diagnoses:   # Schizoaffective Disorder  # ADHD  # PTSD  # History of Methamphetamine Use   Medications  - Olanzapine 30 mg nightly  -PRN: acetaminophen, alum & mag hydroxide-simethicone, hydrOXYzine, melatonin, OLANZapine **OR** OLANZapine, polyethylene glycol     # Discontinued Medications (& Rationale):  Quetiapine, took her off this and restarted olanzapine per mom (guardian's) request  Divalproex, patient does not want to take, levels suggest non-adherence at home   Hold Atomoxetine 40 mg while inpatient     Medical course:   Patient was medically cleared for admission to inpatient unit. No PTA medications.      Consults: None    Lab/Imagin21: COVID negative. Valproate subtherapeutic. UDS negative. 21: CMP with low albumin (2.8), Lipids, TSH, Folate, B12 normal. 21 Hgb A1c  5.2.  EKG 7/21/21 normal sinus rhythm.     Legal Status: Committed    Disposition: Likely to IRTS facility, pending interviews next week (referrals are in per [facility])    Safety Assessment:   Behavioral Orders   Procedures     Assault precautions     Code 1 - Restrict to Unit     Discontinue 1:1 attendant for suicide risk     Order Specific Question:   I have performed an in person assessment of the patient     Answer:   Based on this assessment the patient no longer requires a one on one attendant at this point in time.     Routine Programming     As clinically indicated     Self Injury Precaution     Status 15     Every 15 minutes.     Suicide precautions     Patients on Suicide Precautions should have a Combination Diet ordered that includes a Diet selection(s) AND a Behavioral Tray selection for Safe Tray - with utensils, or Safe Tray - NO utensils          Patient seen and discussed with my attending physician, Dr. Henry Sharp MD who is in agreement with my assessment and plan.    Nae Crane  Medical Student, 3rd Year    Lorene Brown MD, PhD  PGY-2 Psychiatry Resident    Attestation :  This patient has been seen and evaluated by me, Henry Sharp.  I have discussed this patient with the psychiatry resident and I agree with the findings and plan in this note.    I have reviewed today's vital signs, medications, labs and imaging.     Herny Luna MD on 7/26/2021 at 10:16 PM

## 2021-07-23 NOTE — PLAN OF CARE
Slept in until close to 1300, then ate lunch and attended group.  Has been pleasant with staff.  Denies suicidal thoughts and hallucinations.   Declined to have repeat Covid test done.     Problem: Adult Inpatient Plan of Care  Goal: Plan of Care Review  Outcome: No Change  Flowsheets (Taken 7/23/2021 1500)  Plan of Care Reviewed With: patient  Goal: Patient-Specific Goal (Individualized)  Outcome: No Change  Goal: Absence of Hospital-Acquired Illness or Injury  Outcome: No Change  Goal: Optimal Comfort and Wellbeing  Outcome: No Change  Goal: Readiness for Transition of Care  Outcome: No Change     Problem: Suicidal Behavior  Goal: Suicidal Behavior is Absent or Managed  Outcome: No Change     Problem: Behavioral Health Plan of Care  Goal: Plan of Care Review  Outcome: No Change  Flowsheets (Taken 7/23/2021 1500)  Plan of Care Reviewed With: patient  Goal: Patient-Specific Goal (Individualization)  Outcome: No Change  Note:     Goal: Adheres to Safety Considerations for Self and Others  Outcome: No Change  Goal: Absence of New-Onset Illness or Injury  Outcome: No Change  Goal: Optimized Coping Skills in Response to Life Stressors  Outcome: No Change  Goal: Develops/Participates in Therapeutic [place] to Support Successful Transition  Outcome: No Change     Problem: General Rehab Plan of Care  Goal: Therapeutic Recreation/Music Therapy Goal  Description: The patient and/or their representative will achieve their patient-specific goals related to the plan of care.  The patient-specific goals include:  Outcome: No Change

## 2021-07-23 NOTE — PLAN OF CARE
BEHAVIORAL TEAM DISCUSSION    Participants: Dr. Sharp, Psychiatry Resident, Med Students, Vicki RN, [facility]  Progress: Continues to denies anxiety or hallucinations. Patient is compliant with her medications. No behavioral concerns noted.   Anticipated length of stay: 5-10 days  Continued Stay Criteria/Rationale: Patient is committed. Has legal guardian. Needs clinical stabilization and medication management.   Medical/Physical: See MD's daily note  Precautions:   Behavioral Orders   Procedures     Assault precautions     Code 1 - Restrict to Unit     Discontinue 1:1 attendant for suicide risk     Order Specific Question:   I have performed an in person assessment of the patient     Answer:   Based on this assessment the patient no longer requires a one on one attendant at this point in time.     Routine Programming     As clinically indicated     Self Injury Precaution     Status 15     Every 15 minutes.     Suicide precautions     Patients on Suicide Precautions should have a Combination Diet ordered that includes a Diet selection(s) AND a Behavioral Tray selection for Safe Tray - with utensils, or Safe Tray - NO utensils       Plan: Placement at IR facility. Will follow up with outpatient MH providers   Rationale for change in precautions or plan: None

## 2021-07-23 NOTE — PROGRESS NOTES
" 07/22/21 2100   Groups   Details   (Psychotherapy)   Number of patients attending the group:  6  Group Length: 1 hour  Group Therapy Type: Psychotherapy    Summary of Group / Topics Discussed:      The  Psychotherapy group goal is to promote insight to positive choice and change. Group processing is within a supportive and safe environment. Patients will process emotions using verbal group and expressive psychotherapy interventions including visual art/writing interventions.    Group interventions support patients by: optional creative self expression, communication/social skills and supports and emotional regulation/ coping through verbal processing    Modalities to reach these goals include:  Mindfulness practices, Process Group , mindfulness meditation practice and mandala making    Subjective -patient report of mood today- \"content\"    Objective/ Intervention- Goal of group and Therapeutic modality utilized- verbal processing , mindfulness mediation and mandala art project    Group Response- engaged - group was vocal     Patient Response-Pt was engaged with a mindfulness project. She needed step by step assistance and seemed to want others to do her work for her saying she didn't understand. She did show low cognition. Writer walked her through step by step, but she left early somewhat quietly frustrated with not thinking she was doing the project correctly even with a lot of assistance and reassurance.  She used musical notes as a symbol for her project. She said mindful skills she enjoys are breathing and coloring.    Rick Win, KAREN, ATR-BC        "

## 2021-07-23 NOTE — PLAN OF CARE
Pt was visible in the milieu throughout this evening shift. She appeared to enjoy choosing and watching movies and intermittently socializing with other patients and staff. Her mood was mostly calm, and she was polite and appropriate in her interactions with nursing staff. She attended group psychotherapy later in the evening and was medication compliant. No PRN medications were administered. She denied any SI/SIB. Denied AH/VH.    Problem: Behavioral Health Plan of Care  Goal: Plan of Care Review  Recent Flowsheet Documentation  Taken 7/22/2021 2010 by Lynette Richard  Plan of Care Reviewed With: patient  Patient Agreement with Plan of Care: agrees

## 2021-07-24 PROCEDURE — 250N000013 HC RX MED GY IP 250 OP 250 PS 637: Performed by: STUDENT IN AN ORGANIZED HEALTH CARE EDUCATION/TRAINING PROGRAM

## 2021-07-24 PROCEDURE — 124N000002 HC R&B MH UMMC

## 2021-07-24 RX ADMIN — OLANZAPINE 30 MG: 20 TABLET, ORALLY DISINTEGRATING ORAL at 21:23

## 2021-07-24 RX ADMIN — MELATONIN TAB 3 MG 3 MG: 3 TAB at 21:23

## 2021-07-24 ASSESSMENT — ACTIVITIES OF DAILY LIVING (ADL)
DRESS: STREET CLOTHES;INDEPENDENT
LAUNDRY: WITH SUPERVISION
ORAL_HYGIENE: INDEPENDENT
DRESS: WITH ASSISTANCE
HYGIENE/GROOMING: INDEPENDENT
HYGIENE/GROOMING: INDEPENDENT
LAUNDRY: UNABLE TO COMPLETE
ORAL_HYGIENE: INDEPENDENT

## 2021-07-24 NOTE — PROGRESS NOTES
Erika attended an OT relaxation yoga group this evening. Minimal active participation in the group. Appeared internally preoccupied.      07/23/21 2000   Occupational Therapy   Type of Intervention structured groups   Response Quiet but attentive   Hours 1

## 2021-07-24 NOTE — PLAN OF CARE
Patient tense and irritable this shift.  Needed a lot of redirection from poor boundaries.  Flat affect, denies SI/SIB, mental health symptoms.  Patient currently watching movies in the lounge with other peers.  Will continue to monitor closely.

## 2021-07-24 NOTE — PLAN OF CARE
Pt was visible in the milieu throughout the majority of this evening shift. However, she interacted minimally with other patients and staff. When she did interact with others, she was calm and polite. Pt's affect was overall flat but her mood was calm. No signs of irritability or agitation as was common upon admission for her. Denied any SI/SIB. Medication compliant.     Problem: Behavioral Health Plan of Care  Goal: Plan of Care Review  Recent Flowsheet Documentation  Taken 7/23/2021 2128 by Lynette Richard  Plan of Care Reviewed With: patient  Patient Agreement with Plan of Care: agrees

## 2021-07-25 PROCEDURE — 124N000002 HC R&B MH UMMC

## 2021-07-25 PROCEDURE — 250N000013 HC RX MED GY IP 250 OP 250 PS 637: Performed by: STUDENT IN AN ORGANIZED HEALTH CARE EDUCATION/TRAINING PROGRAM

## 2021-07-25 RX ADMIN — OLANZAPINE 30 MG: 20 TABLET, ORALLY DISINTEGRATING ORAL at 20:11

## 2021-07-25 RX ADMIN — MELATONIN TAB 3 MG 3 MG: 3 TAB at 20:11

## 2021-07-25 ASSESSMENT — ACTIVITIES OF DAILY LIVING (ADL)
LAUNDRY: WITH SUPERVISION
HYGIENE/GROOMING: INDEPENDENT
HYGIENE/GROOMING: INDEPENDENT
ORAL_HYGIENE: INDEPENDENT
LAUNDRY: WITH SUPERVISION
DRESS: INDEPENDENT
DRESS: INDEPENDENT
ORAL_HYGIENE: INDEPENDENT

## 2021-07-25 NOTE — PLAN OF CARE
Pt slept in for a majority of the shift, once she woke up she paced in the hallway. She took a shower today and has not had any behavioral incidents. Pt currently denies all mental health symptoms.

## 2021-07-25 NOTE — PLAN OF CARE
Pt appeared to sleep for 7 hours overnight. No prns or snacks given. No concerns reported or noted. Will continue to monitor.

## 2021-07-25 NOTE — PLAN OF CARE
Pt was quiet and reserved this evening shift. Her mood was calm, which was a change from her reported behavior and mood earlier in the day. This evening, she was observed pacing the hallway by herself and occassionally watching movies in the lounge among the other patients. She was minimally social with other patients or staff. She was medication compliant and asked to have her HS medications explained to her. She communicated acceptance and understanding of the explanation.     Problem: Behavioral Health Plan of Care  Goal: Plan of Care Review  Recent Flowsheet Documentation  Taken 7/24/2021 2992 by Lynette Richard  Plan of Care Reviewed With: patient  Patient Agreement with Plan of Care: agrees

## 2021-07-25 NOTE — PLAN OF CARE
Problem: Adult Inpatient Plan of Care  Goal: Absence of Hospital-Acquired Illness or Injury  Outcome: Improving  Goal: Optimal Comfort and Wellbeing  Outcome: Improving  Intervention: Provide Person-Centered Care  Recent Flowsheet Documentation  Taken 7/25/2021 1600 by Amparo Paulino, RN  Trust Relationship/Rapport:    care explained    choices provided    emotional support provided    empathic listening provided    questions answered    thoughts/feelings acknowledged    reassurance provided     Problem: Suicidal Behavior  Goal: Suicidal Behavior is Absent or Managed  Outcome: Improving     Problem: Behavioral Health Plan of Care  Goal: Plan of Care Review  Outcome: Improving  Flowsheets (Taken 7/25/2021 1600)  Plan of Care Reviewed With: patient  Patient Agreement with Plan of Care: agrees  Goal: Adheres to Safety Considerations for Self and Others  Outcome: Improving     Patient in the hallway pacing when received this shift, compliant with MH assessment, flat affect on approach, denies all psych symptoms, denies SI/SIB/hallucinations, gloria for safety, medication compliant, safety checks in place every 15 minutes per unit policy,

## 2021-07-26 PROCEDURE — 99232 SBSQ HOSP IP/OBS MODERATE 35: CPT | Mod: GC | Performed by: PSYCHIATRY & NEUROLOGY

## 2021-07-26 PROCEDURE — 124N000002 HC R&B MH UMMC

## 2021-07-26 PROCEDURE — 250N000013 HC RX MED GY IP 250 OP 250 PS 637: Performed by: STUDENT IN AN ORGANIZED HEALTH CARE EDUCATION/TRAINING PROGRAM

## 2021-07-26 PROCEDURE — G0177 OPPS/PHP; TRAIN & EDUC SERV: HCPCS

## 2021-07-26 RX ADMIN — OLANZAPINE 30 MG: 20 TABLET, ORALLY DISINTEGRATING ORAL at 20:38

## 2021-07-26 RX ADMIN — MELATONIN TAB 3 MG 3 MG: 3 TAB at 20:38

## 2021-07-26 NOTE — PROGRESS NOTES
"Pt checked in with group, she reported mood as  \" bored in general.\" She chose not to stay for group and walked out without explanation.  "

## 2021-07-26 NOTE — PLAN OF CARE
Pt slept in late this morning and spent the rest of the shift pacing the halls. She does not interact with peers or attend groups. Pt currently denies all mental health symptoms. She has been calm this shift, has not required any redirection. She is showing acceptance to her discharge plan and asked writer about IRTS facilities and interviews for IRTS.

## 2021-07-26 NOTE — PLAN OF CARE
Assessment/Intervention/Current Symptoms and Care Coordination: Met with team. Writer spoke with patient's CM, Lauren Bay [phone]. Writer provided updated CM on patient's progress and aftercare plan. Writer called Sara to follow up on IRTS referral. Writer provided update on patient's behavior and treatment progress. Writer called [facility] to cofirm patient's psychiatry appointment for 8/3/2021 at 1:40 PM. Writer received a call from Jessica Huber indicating that patient's referral was received and she was placed in their wait list. Patient is #8 in their wait list.      Discharge Plan or Goal: Potential IRTS placement or home.             Barriers to Discharge: Irritable and aggressive. Patient is committed. Needs clinical stabilization and medication management.               Referral Status: Patient was referred to IRTS facilities.            Legal Status: Committed and has legal guardian

## 2021-07-26 NOTE — PLAN OF CARE
Pt appeared to sleep 7 hours overnight. No prns or snacks were given. No concerns reported or noted. Will continue to monitor.

## 2021-07-26 NOTE — PROGRESS NOTES
[facility]  Occupational Therapy Behavioral Health Assessment    Patient Name: Erika Claudio    Description: OT staff met with Erika to review the role of occupational therapy and explain the value of having them involved in their treatment plan including options to meet current needs/self-identified goals. Overall, Erika has attended 3 OT group interventions so far with a restricted affect and intermittent engagement. The below evaluation is a compilation of functional performance observation.     Clinical impression through direct observation:     07/26/21 0904   Clinical Impression   Affect Restricted   Orientation Oriented to person, place and time   Appearance and ADLs Disheveled   Attention to Internal Stimuli Appears distracted/preoccupied internally   Interaction Skills Interacts appropriately with staff   Ability to Communicate Needs Independent   Verbal Content Appropriate to topic;Brookesmith   Ability to Maintain Boundaries Maintains appropriate physical boundaries;Needs occasional cues   Participation Participates with minimal encouragement   Concentration Concentrates 20-30 minutes   Ability to Concentrate With structure;With refocus   Follows and Comprehends Directions Independently follows 1 step verbal directions   Memory Delayed and immediate recall intact   Organization Needs occasional assistance    Decision Making Needs choices limited to 3 choices   Planning and Problem Solving Indentifies problems but not alternatives;Needs assistance   Ability to Apply and Learn Concepts Applies within group structure   Frustrations / Stress Tolerance Easily frustrated;Independently identifies skills    Level of Insight Some insight   Self Esteem Can identify positives   Social Supports Has knowledge of support systems  (mother)       Assessment: Erika has notable strengths including opportunities to live a meaningful life, optimism that change can occur, social relationships, and accepting of  assistance. Due to those strengths, Erika may benefit from continue engagement in OT groups that support healthy recovery, specifically exploration of positive coping skills for symptom management/relapse prevention. Per care team discussion, Erika may benefit from sensory interventions to aid in sleep/wake cycle.     Plan: Initiate care plan goals and interventions. Open blinds every morning by 10am to assist in regular wake schedule.     IP OT Goal: Erika will demonstrate consistent engagement in OT group activities that support recovery as evidenced by participating in >1 scheduled OT group/day for 5 days.       Plan for Next Treatment: Provide graded occupation-based activities for increased success and ongoing assessment.     Amina Street, OT on 7/26/2021 at 9:12 AM

## 2021-07-26 NOTE — PROGRESS NOTES
"    ----------------------------------------------------------------------------------------------------------  [facility]  Psychiatric Progress Note  Hospital Day #10     Subjective     The patient's care was discussed with the treatment team and chart notes were reviewed.     Vitals: VSS  Sleep:  7 hours (07/26/21 0600)  Prescribed Medications: Taken as prescribed.   PRN Medications: acetaminophen, alum & mag hydroxide-simethicone, hydrOXYzine, OLANZapine **OR** OLANZapine, polyethylene glycol     Per Staff Notes:  Pt checked in with group, she reported mood as  \" bored in general.\" She chose not to stay for group and walked out without explanation.  Pt appeared to sleep 7 hours overnight. No prns or snacks were given. No concerns reported or noted. Will continue to monitor.    Per Interview:   Erika expressed that she wanted to leave. We discussed again the discharge plan and gave her updates on IRTS facility referrals. She asked a few questions about what these facilities are like, how long she would be there, and when she would leave. At one point during the discussion she asked irritably, \"are you just going to tell me the same thing every day?\". We acknowledged that there was nothing really new and that we were just continuing to wait on the referrals. She denies side effects of medications and had no other questions or concerns for the team.    The risks, benefits, alternatives, and side effects of treatments including no treatment have been discussed and are understood by the patient and other caregivers.    Review of systems:  Complete psychiatric ROS is negative unless otherwise noted above.      Objective     /74   Pulse 81   Temp 97.7  F (36.5  C) (Oral)   Resp 16   Ht 1.702 m (5' 7\")   Wt 130.4 kg (287 lb 8 oz)   LMP  (LMP Unknown)   SpO2 97%   BMI 45.03 kg/m      Weight is 287 lbs 8 oz  Body mass index is 45.03 kg/m .    Psychiatric Examination:  Appearance:  " poorly groomed and fatigued  Muscle Strength and Tone: normal  Gait and Station: Normal  Behavior (Psychomotor):  no evidence of tardive dyskinesia, dystonia, or tics  Eye Contact:  fair/intense  Speech:  clear, coherent  Mood:  depressed  Affect:  intensity is flat and guarded  Attitude:  guarded  Thought Process:  disorganized  Thought Content:  no evidence of suicidal ideation or homicidal ideation  Associations:  no loose associations  Insight:  limited  Judgment:  limited  Oriented to:  time, person, and place  Attention Span and Concentration:  limited  Recent and Remote Memory:  limited  Language: Fluent in English with appropriate syntax and vocabulary.  Fund of Knowledge: appropriate      No results found for this or any previous visit (from the past 24 hour(s)).     Assessment & Plan      Erika Claudio a 25 year old female with a past psychiatric history of schizoaffective disorder,  intellectual disability, PTSD, ADHD, and substance use disorder (amphetamine) who presented to the ED with out of control behaviors in the context of possible hallucinations and medication non-adherence. Significant symptoms include SI, aggression and impulsive. Her last psychiatric hospitalization was in 9/2020.  She is currently followed by Dr. Amador at [facility]. Current psychosocial stressors include family dynamics which she has been coping with by acting out to others.  Patient's support system includes family and county.  Substance use does not appear to be playing a contributing role in the patient's presentation.  There is genetic loading for CD. Medical history does not appear to be significant.  Psychologically has some reported issues with her mood, particularly related to fatigue. Social supports include CM and pt's mother; previously in a group home but currently living with mother and brother pending a new group home. The MSE is notable for poor insight into current illness; description of symptoms  consistent with AH but denies categorizing them as such. She denies recent self injurious behaviors. Her reported symptoms of agression and possible hallucinations are consistent with her historic diagnosis of schizoaffective disorder vs undifferentiated psychotic disorder. Patient denying symptoms of catalina, but unclear about reliability as a historian. Additionally, she has an intellectual disability which interfere with her ability to adhere with the treatment plan.  Optimization of medications to target these symptom clusters in addition to evaluation of adquate outpatient supports will be targets for treatment during this admission.     Erika Claudio continues to meet criteria for ongoing inpatient hospitalization given reported SI, out of control behaviors and psychosis.    Diagnostic Impression:  The patient's presentation is consistent with Schizoaffective Disorder vs Undifferentiated Psychosis.    Today's Changes  None    Hospital Course:   Erika Claudio was admitted to station 20 with attending Henry Sharp on commitment and will be treated in the therapeutic milieu with appropriate individual and group therapies.  PTA medications depakote, seroquel, atomoxitine, and hydroxyzine. We discontinued the depakote and seroquel. Atomoxetine was held during admission. She was started on olanzapine which we increased from 5 to 30 mg over time.     Psychiatric diagnoses:   # Schizoaffective Disorder  # ADHD  # PTSD  # History of Methamphetamine Use   Medications  - Olanzapine 30 mg nightly  -PRN: acetaminophen, alum & mag hydroxide-simethicone, hydrOXYzine, melatonin, OLANZapine **OR** OLANZapine, polyethylene glycol     # Discontinued Medications (& Rationale):  Quetiapine, took her off this and restarted olanzapine per mom (guardian's) request  Divalproex, patient does not want to take, levels suggest non-adherence at home   Hold Atomoxetine 40 mg while inpatient     Medical course:   Patient was  medically cleared for admission to inpatient unit. No PTA medications.      Consults: None    Lab/Imagin21: COVID negative. Valproate subtherapeutic. UDS negative. 21: CMP with low albumin (2.8), Lipids, TSH, Folate, B12 normal. 21 Hgb A1c 5.2.  EKG 21 normal sinus rhythm.     Legal Status: Committed    Disposition: Likely to IRTS facility, pending interviews this week [facility]    Safety Assessment:   Behavioral Orders   Procedures     Assault precautions     Code 1 - Restrict to Unit     Discontinue 1:1 attendant for suicide risk     Order Specific Question:   I have performed an in person assessment of the patient     Answer:   Based on this assessment the patient no longer requires a one on one attendant at this point in time.     Routine Programming     As clinically indicated     Self Injury Precaution     Status 15     Every 15 minutes.     Suicide precautions     Patients on Suicide Precautions should have a Combination Diet ordered that includes a Diet selection(s) AND a Behavioral Tray selection for Safe Tray - with utensils, or Safe Tray - NO utensils          Patient seen and discussed with my attending physician, Dr. Henry Sharp MD who is in agreement with my assessment and plan.      Lorene Brown MD, PhD  PGY-2 Psychiatry Resident    Attestation :  This patient has been seen and evaluated by me, Henry Sharp.  I have discussed this patient with the psychiatry resident and I agree with the findings and plan in this note.    I have reviewed today's vital signs, medications, labs and imaging.     Henry Luna MD on 2021 at 10:18 PM

## 2021-07-27 PROCEDURE — 250N000013 HC RX MED GY IP 250 OP 250 PS 637: Performed by: STUDENT IN AN ORGANIZED HEALTH CARE EDUCATION/TRAINING PROGRAM

## 2021-07-27 PROCEDURE — 124N000002 HC R&B MH UMMC

## 2021-07-27 PROCEDURE — 99232 SBSQ HOSP IP/OBS MODERATE 35: CPT | Mod: GC | Performed by: PSYCHIATRY & NEUROLOGY

## 2021-07-27 PROCEDURE — G0177 OPPS/PHP; TRAIN & EDUC SERV: HCPCS

## 2021-07-27 RX ADMIN — MELATONIN TAB 3 MG 3 MG: 3 TAB at 19:33

## 2021-07-27 RX ADMIN — OLANZAPINE 30 MG: 20 TABLET, ORALLY DISINTEGRATING ORAL at 19:32

## 2021-07-27 ASSESSMENT — ACTIVITIES OF DAILY LIVING (ADL)
ORAL_HYGIENE: INDEPENDENT
DRESS: INDEPENDENT
HYGIENE/GROOMING: INDEPENDENT
LAUNDRY: WITH SUPERVISION

## 2021-07-27 NOTE — PLAN OF CARE
Assessment/Intervention/Current Symptoms and Care Coordination: Met with team. Reviewed patient's chart and progress notes. Writer received a vm from Caitlyn at [facility]. Writer called Caitlyn at [phone]. Writer left vm requesting a call back. Writer called Lex at [facility] at [phone]. [phone]. Writer left  requesting a call back. Writer call Sara and spoke with Eric. No changes with regards to placement at this moment.         Discharge Plan or Goal: Potential IRTS placement or home.             Barriers to Discharge:  Patient is committed. Needs clinical stabilization and medication management.               Referral Status: Patient was referred to IRTS facilities.            Legal Status: Committed and has legal guardian

## 2021-07-27 NOTE — PLAN OF CARE
Problem: Suicidal Behavior  Goal: Suicidal Behavior is Absent or Managed  Outcome: Improving     Problem: Behavioral Health Plan of Care  Goal: Plan of Care Review  Outcome: Improving  Goal: Adheres to Safety Considerations for Self and Others  Outcome: Improving  Goal: Absence of New-Onset Illness or Injury  Outcome: Improving  Goal: Optimized Coping Skills in Response to Life Stressors  Outcome: Improving     Patient up and visible on the unit, does not engage with peers, flat affect on approach, denies all mental health symptoms, no SI/SIB/hallucinations, gloria for safety, safety checks in place every 15 minutes per unit policy, eating and drinking well, medication compliant, offers no other concerns at this time, will offer support as needed per patient;'s plan of care.

## 2021-07-27 NOTE — PROGRESS NOTES
Psychoeducation/Process Group  Attendance: 6 patients  Duration: 50 minutes   Topic: Depression: Causes, identifying triggers, and coping strategies.     The  Psychotherapy group goal is to promote insight into cognitive distortions. Group processing is conducted within a supportive and safe environment by implementing motivational interviewing, positive change decision making.      The patient will understand the mechanisms of depression. Patients will be able to process emotions and verbalize needs. Additionally, the patient will be able to identify healthy coping skills to shift arousal levels.     Patient s response: Patient was engaged and cooperative with group activities. Patient was able to identify events that can potentially trigger her depression. Patient was able to share what stood out to her in the video about depression.

## 2021-07-27 NOTE — PROGRESS NOTES
"    ----------------------------------------------------------------------------------------------------------  [facility]  Psychiatric Progress Note  Hospital Day #11     Subjective     The patient's care was discussed with the treatment team and chart notes were reviewed.     Vitals: VSS  Sleep:  7 hours (07/27/21 0600)  Prescribed Medications: Taken as prescribed.   PRN Medications: acetaminophen, alum & mag hydroxide-simethicone, hydrOXYzine, OLANZapine **OR** OLANZapine, polyethylene glycol     Per Interview:   In the interview today, Erika was agreeable to talking with us despite being woken up earlier than usual. She said she is doing \"fine\" and that she has been going to some of the groups. She is looking forward to going to an IRTS facility and asked for updates about the process to get a better understanding of how long she would be here. She denied side effects of medications and had no other questions or concerns for the team.     The risks, benefits, alternatives, and side effects of treatments including no treatment have been discussed and are understood by the patient and other caregivers.    Review of systems:  Complete psychiatric ROS is negative unless otherwise noted above.      Objective     /82   Pulse 108   Temp 97.9  F (36.6  C) (Oral)   Resp 16   Ht 1.702 m (5' 7\")   Wt 130.4 kg (287 lb 8 oz)   LMP  (LMP Unknown)   SpO2 96%   BMI 45.03 kg/m      Weight is 287 lbs 8 oz  Body mass index is 45.03 kg/m .    Psychiatric Examination:  Appearance:  poorly groomed and fatigued  Muscle Strength and Tone: normal  Behavior (Psychomotor):  no evidence of tardive dyskinesia, dystonia, or tics  Eye Contact:  fair/intense  Speech:  clear, coherent  Mood:  \"fine\"  Affect:  Anxious, somewhat guarded though less so than previously  Attitude:  guarded  Thought Process:  disorganized  Thought Content:  no evidence of suicidal ideation or homicidal ideation  Associations: "  no loose associations  Insight:  limited  Judgment:  limited   Fund of Knowledge: low/appropriate  Oriented to:  time, person, and place  Attention Span and Concentration:  limited  Recent and Remote Memory:  limited    No results found for this or any previous visit (from the past 24 hour(s)).     Assessment & Plan     Today's Changes  None    Erika Claudio a 25 year old female with a past psychiatric history of schizoaffective disorder,  intellectual disability, PTSD, ADHD, and substance use disorder (amphetamine) who presented to the ED with out of control behaviors in the context of possible hallucinations and medication non-adherence. Significant symptoms include SI, aggression and impulsive. Her last psychiatric hospitalization was in 9/2020.  She is currently followed by Dr. Amador at [facility]. Current psychosocial stressors include family dynamics which she has been coping with by acting out to others.  Patient's support system includes family and county.  Substance use does not appear to be playing a contributing role in the patient's presentation.  There is genetic loading for CD. Medical history does not appear to be significant.  Psychologically has some reported issues with her mood, particularly related to fatigue. Social supports include CM and pt's mother; previously in a group home but currently living with mother and brother pending a new group home. The MSE is notable for poor insight into current illness; description of symptoms consistent with AH but denies categorizing them as such. She denies recent self injurious behaviors. Her reported symptoms of agression and possible hallucinations are consistent with her historic diagnosis of schizoaffective disorder vs undifferentiated psychotic disorder. Patient denying symptoms of catalina, but unclear about reliability as a historian. Additionally, she has an intellectual disability which interferes with her ability to adhere with the  treatment plan.  Optimization of medications to target these symptom clusters in addition to evaluation of adquate outpatient supports will be targets for treatment during this admission.     Erika Claudio continues to meet criteria for ongoing inpatient hospitalization given reported SI, out of control behaviors and psychosis.    Diagnostic Impression:  The patient's presentation is consistent with Schizoaffective Disorder vs Undifferentiated Psychosis.    Psychiatric diagnoses:   # Schizoaffective Disorder  # ADHD  # PTSD  # History of Methamphetamine Use   Medications  - Olanzapine 30 mg nightly  -PRN: acetaminophen, alum & mag hydroxide-simethicone, hydrOXYzine, melatonin, OLANZapine **OR** OLANZapine, polyethylene glycol     # Discontinued Medications (& Rationale):  Quetiapine, took her off this and restarted olanzapine per mom (guardian's) request  Divalproex, patient does not want to take, levels suggest non-adherence at home   Hold Atomoxetine 40 mg while inpatient     Medical course:   Patient was medically cleared for admission to inpatient unit. No PTA medications.      Consults: None    Lab/Imagin21: COVID negative. Valproate subtherapeutic. UDS negative. 21: CMP with low albumin (2.8), Lipids, TSH, Folate, B12 normal. 21 Hgb A1c 5.2.  EKG 21 normal sinus rhythm.     Legal Status: Committed    Hospital Course:   Erika Claudio was admitted to station 20 with attending Henry Sharp on commitment and will be treated in the therapeutic milieu with appropriate individual and group therapies.  PTA medications depakote, seroquel, atomoxetine, and hydroxyzine. We discontinued the depakote and seroquel. Atomoxetine was held during admission. She was started on olanzapine which we increased from 5 to 30 mg over time.     Disposition: Likely to IRTS facility, pending interviews this week (referrals are in per [facility])    Safety Assessment:   Behavioral Orders   Procedures      Assault precautions     Code 1 - Restrict to Unit     Discontinue 1:1 attendant for suicide risk     Order Specific Question:   I have performed an in person assessment of the patient     Answer:   Based on this assessment the patient no longer requires a one on one attendant at this point in time.     Routine Programming     As clinically indicated     Self Injury Precaution     Status 15     Every 15 minutes.     Suicide precautions     Patients on Suicide Precautions should have a Combination Diet ordered that includes a Diet selection(s) AND a Behavioral Tray selection for Safe Tray - with utensils, or Safe Tray - NO utensils          Patient seen and discussed with my attending physician, Dr. Henry Sharp MD who is in agreement with my assessment and plan.      Lorene Brown MD, PhD  PGY-2 Psychiatry Resident    This patient has been seen and evaluated by me, Henry Sharp.  I have discussed this patient with the psychiatry resident and I agree with the findings and plan in this note.    I have reviewed today's vital signs, medications, labs and imaging.     Henry Luna MD on 7/28/2021 at 2:04 PM

## 2021-07-27 NOTE — PLAN OF CARE
"Problem: OT General Care Plan  Goal: OT Goal 1  Description: Erika will demonstrate consistent engagement in OT group activities that support recovery as evidenced by participating in >1 scheduled OT group/day for 5 days.     Pt actively participated in a structured occupational therapy group with a focus on a visual-spatial leisure task. Pt was able to follow 2-step directions of the novel task with intermittent minimal assist. Pt remained engaged for the majority of group though contributed unrelated comments to the group (I.e. \"I wonder why my doctor is prescribing me to take zyprexa at night?\") writer encouraged pt to bring these concerns to her provider. Pts ability to attend to the task subsided over time, benefiting from verbal cues and moderate assistance for the second half of group.  "

## 2021-07-27 NOTE — PLAN OF CARE
Pt has spent most of the shift pacing in the hallway, she tends to keep to herself. She has had a good evening and has remained calm throughout the shift. Her mom visited this evening and she reported that it went well. Pt currently denies all mental health symptoms. Med compliant with no stated or observed side effects.

## 2021-07-27 NOTE — PLAN OF CARE
"  Problem: Behavioral Health Plan of Care  Goal: Plan of Care Review  Outcome: No Change     Problem: Behavioral Health Plan of Care  Goal: Patient-Specific Goal (Individualization)  Outcome: No Change       Pt slept in  the whole morning. When she was woken up for breakfast, pt ignored staff and went back to sleep. Then she woke up after lunch and consumed her lunch tray. Would then be intermittently visible in the halls pacing. Then she attended the afternoon group. Pt presented with a flat affect.  Pt keeps to herself. No interaction with peers noted. Pt was dismissive with her response to staff. When asked how she was, \"I'm okay, thank you\". Then she went and attended group.   "

## 2021-07-28 PROCEDURE — 124N000002 HC R&B MH UMMC

## 2021-07-28 PROCEDURE — 99232 SBSQ HOSP IP/OBS MODERATE 35: CPT | Mod: GC | Performed by: PSYCHIATRY & NEUROLOGY

## 2021-07-28 PROCEDURE — 250N000013 HC RX MED GY IP 250 OP 250 PS 637: Performed by: STUDENT IN AN ORGANIZED HEALTH CARE EDUCATION/TRAINING PROGRAM

## 2021-07-28 PROCEDURE — H2032 ACTIVITY THERAPY, PER 15 MIN: HCPCS

## 2021-07-28 PROCEDURE — G0177 OPPS/PHP; TRAIN & EDUC SERV: HCPCS

## 2021-07-28 RX ORDER — HYDROXYZINE HYDROCHLORIDE 25 MG/1
25 TABLET, FILM COATED ORAL EVERY 4 HOURS PRN
Qty: 60 TABLET | Refills: 0 | Status: SHIPPED | OUTPATIENT
Start: 2021-07-28

## 2021-07-28 RX ORDER — LANOLIN ALCOHOL/MO/W.PET/CERES
3 CREAM (GRAM) TOPICAL
Qty: 30 TABLET | Refills: 0 | Status: SHIPPED | OUTPATIENT
Start: 2021-07-28

## 2021-07-28 RX ORDER — OLANZAPINE 15 MG/1
30 TABLET ORAL AT BEDTIME
Qty: 60 TABLET | Refills: 1 | Status: SHIPPED | OUTPATIENT
Start: 2021-07-28

## 2021-07-28 RX ADMIN — MELATONIN TAB 3 MG 3 MG: 3 TAB at 19:36

## 2021-07-28 RX ADMIN — OLANZAPINE 30 MG: 20 TABLET, ORALLY DISINTEGRATING ORAL at 19:36

## 2021-07-28 ASSESSMENT — ACTIVITIES OF DAILY LIVING (ADL)
HYGIENE/GROOMING: INDEPENDENT
DRESS: SCRUBS (BEHAVIORAL HEALTH)
LAUNDRY: WITH SUPERVISION
LAUNDRY: UNABLE TO COMPLETE
ORAL_HYGIENE: INDEPENDENT
HYGIENE/GROOMING: INDEPENDENT
DRESS: SCRUBS (BEHAVIORAL HEALTH)
ORAL_HYGIENE: INDEPENDENT

## 2021-07-28 NOTE — PLAN OF CARE
Assessment/Intervention/Current Symptoms and Care Coordination: Checked in with patient. Reviewed patient's chart and progress notes. Writer received a vm from Will at [facility] stating that he will interview patient today at 10:30 am. patient's SIOBHAN [place] [phone]. Writer provided update on patient's progress and treatment disposition. Writer received a call from [facility] stating that patient [facility] in [place]. Admission is scheduled for 7/29 at 11:00 AM. Pharmacy is [facility] in [place] their number is [phone] fax [phone]. Writer called patient's mother/guardian and left vm providing information on disdischarge disposition.      Discharge Plan or Goal: Will be discharged to [facility] in [place]. Will follow up with outpatient mental health providers.              Barriers to Discharge:  Patient is committed. Needs clinical stabilization and medication management.               Referral Status: Patient was referred to IRTS facilities.            Legal Status: Committed and has legal guardian

## 2021-07-28 NOTE — PLAN OF CARE
Patient calm and cooperative, visible in milieu.  Flat affect, denies SI/SIB, all mental health symptoms.  Good appetite at lunch, paces hallway most of the time this shift.  No aggressive behavior noted.  Will continue to monitor closely.

## 2021-07-28 NOTE — PROGRESS NOTES
"    ----------------------------------------------------------------------------------------------------------  [facility]  Psychiatric Progress Note  Hospital Day #12     Subjective     The patient's care was discussed with the treatment team and chart notes were reviewed.     Vitals: VSS  Sleep:  7 hours (07/28/21 0603)  Prescribed Medications: Taken as prescribed.   PRN Medications: acetaminophen, alum & mag hydroxide-simethicone, hydrOXYzine, OLANZapine **OR** OLANZapine, polyethylene glycol     Per Interview:   In the interview today, she states she is \"pretty excited\" because she will be going to the group home.  She would like to leave the hospital and while wanting to go home is ok with going to the group home.  She is worried that the other clients in the group home won't like her and this was processed.  She has ongoing delusions and paranoid ideation, but this is improved.  She is tolerating the medications well.       The risks, benefits, alternatives, and side effects of treatments including no treatment have been discussed and are understood by the patient and other caregivers.    Review of systems:  Complete psychiatric ROS is negative unless otherwise noted above.      Objective     /80   Pulse 115   Temp 98.2  F (36.8  C) (Oral)   Resp 16   Ht 1.702 m (5' 7\")   Wt 130.4 kg (287 lb 8 oz)   LMP  (LMP Unknown)   SpO2 97%   BMI 45.03 kg/m      Weight is 287 lbs 8 oz  Body mass index is 45.03 kg/m .    Psychiatric Examination:  Appearance:  adequately groomed and alert  Muscle Strength and Tone: normal  Behavior (Psychomotor):  no evidence of tardive dyskinesia, dystonia, or tics  Eye Contact:  fair/intense  Speech:  clear, coherent  Mood:  \"pretty excited\"  Affect:  Anxious, somewhat guarded though less so than previously  Attitude:  cooperative  Thought Process:  goal oriented  Thought Content:  no evidence of suicidal ideation or homicidal " ideation  Associations:  no loose associations  Insight:  limited  Judgment:  limited   Fund of Knowledge: low/appropriate  Oriented to:  time, person, and place  Attention Span and Concentration:  limited  Recent and Remote Memory:  limited    No results found for this or any previous visit (from the past 24 hour(s)).     Assessment & Plan     Today's Changes  None    Erika Claudio a 25 year old female with a past psychiatric history of schizoaffective disorder,  intellectual disability, PTSD, ADHD, and substance use disorder (amphetamine) who presented to the ED with out of control behaviors in the context of possible hallucinations and medication non-adherence. Significant symptoms include SI, aggression and impulsive. Her last psychiatric hospitalization was in 9/2020.  She is currently followed by Dr. Amador at [facility]. Current psychosocial stressors include family dynamics which she has been coping with by acting out to others.  Patient's support system includes family and county.  Substance use does not appear to be playing a contributing role in the patient's presentation.  There is genetic loading for CD. Medical history does not appear to be significant.  Psychologically has some reported issues with her mood, particularly related to fatigue. Social supports include CM and pt's mother; previously in a group home but currently living with mother and brother pending a new group home. The MSE is notable for poor insight into current illness; description of symptoms consistent with AH but denies categorizing them as such. She denies recent self injurious behaviors. Her reported symptoms of agression and possible hallucinations are consistent with her historic diagnosis of schizoaffective disorder vs undifferentiated psychotic disorder. Patient denying symptoms of catalina, but unclear about reliability as a historian. Additionally, she has an intellectual disability which interferes with her ability to  adhere with the treatment plan.  Optimization of medications to target these symptom clusters in addition to evaluation of adquate outpatient supports will be targets for treatment during this admission.     Erika Claudio continues to meet criteria for ongoing inpatient hospitalization given reported SI, out of control behaviors and psychosis.    Diagnostic Impression:  The patient's presentation is consistent with Schizoaffective Disorder vs Undifferentiated Psychosis.    Psychiatric diagnoses:   # Schizoaffective Disorder  # ADHD  # PTSD  # History of Methamphetamine Use   Medications  - Olanzapine 30 mg nightly  -PRN: acetaminophen, alum & mag hydroxide-simethicone, hydrOXYzine, melatonin, OLANZapine **OR** OLANZapine, polyethylene glycol     # Discontinued Medications (& Rationale):  Quetiapine, took her off this and restarted olanzapine per mom (guardian's) request  Divalproex, patient does not want to take, levels suggest non-adherence at home   Hold Atomoxetine 40 mg while inpatient     Medical course:   Patient was medically cleared for admission to inpatient unit. No PTA medications.      Consults: None    Lab/Imagin21: COVID negative. Valproate subtherapeutic. UDS negative. 21: CMP with low albumin (2.8), Lipids, TSH, Folate, B12 normal. 21 Hgb A1c 5.2.  EKG 21 normal sinus rhythm.     Legal Status: Committed    Hospital Course:   Erika Claudio was admitted to station 20 with attending Henry Sharp on commitment and will be treated in the therapeutic milieu with appropriate individual and group therapies.  PTA medications depakote, seroquel, atomoxetine, and hydroxyzine. We discontinued the depakote and seroquel. Atomoxetine was held during admission. She was started on olanzapine which we increased from 5 to 30 mg over time.     Disposition: Likely to IRTS facility, pending interviews this week (referrals are in per [facility])    Safety Assessment:   Behavioral Orders    Procedures     Assault precautions     Code 1 - Restrict to Unit     Discontinue 1:1 attendant for suicide risk     Order Specific Question:   I have performed an in person assessment of the patient     Answer:   Based on this assessment the patient no longer requires a one on one attendant at this point in time.     Routine Programming     As clinically indicated     Self Injury Precaution     Status 15     Every 15 minutes.     Suicide precautions     Patients on Suicide Precautions should have a Combination Diet ordered that includes a Diet selection(s) AND a Behavioral Tray selection for Safe Tray - with utensils, or Safe Tray - NO utensils          Henry Luna MD on 7/28/2021 at 2:04 PM

## 2021-07-29 VITALS
OXYGEN SATURATION: 97 % | SYSTOLIC BLOOD PRESSURE: 115 MMHG | DIASTOLIC BLOOD PRESSURE: 82 MMHG | HEIGHT: 67 IN | TEMPERATURE: 97.4 F | BODY MASS INDEX: 45.12 KG/M2 | HEART RATE: 108 BPM | RESPIRATION RATE: 16 BRPM | WEIGHT: 287.5 LBS

## 2021-07-29 PROCEDURE — 99238 HOSP IP/OBS DSCHRG MGMT 30/<: CPT | Performed by: PSYCHIATRY & NEUROLOGY

## 2021-07-29 ASSESSMENT — ACTIVITIES OF DAILY LIVING (ADL)
HYGIENE/GROOMING: INDEPENDENT
LAUNDRY: WITH SUPERVISION
ORAL_HYGIENE: INDEPENDENT
DRESS: INDEPENDENT

## 2021-07-29 NOTE — PLAN OF CARE
Assessment/Intervention/Current Symptoms and Care Coordination: Checked in with patient. Reviewed patient's chart and progress notes. Called patient's guarding to confirm discharge. Called [facility] to confirm patient's admission.  Completed AVS.        Discharge Plan or Goal: Will be discharged to [facility] in [place]. Will follow up with outpatient mental health providers.              Barriers to Discharge: Denies SI/SIB/HI. Ready to discharge.               Referral Status:            Legal Status: Committed and has legal guardian

## 2021-07-29 NOTE — DISCHARGE INSTRUCTIONS
Behavioral Discharge Planning and Instructions    Summary: You were admitted on 7/15/2021  due to psychosis and aggression .  You were treated by Dr. Sharp and discharged on 7/29/2021 from [facility] to [facility]     Main Diagnosis: Schizoaffective disorder, intellectual disability, PTSD, ADHD, and substance use disorder         Health Care Follow-up:   [facility]  Admisson Date/Time: 7/29/2021 at 11:00 AM.    Address: [address] [place] [id number]   Phone Number: [phone]    [facility]  Appointment Date/Time: 8/3/2021 at 1:40 PM. This will be a zoom appointment  Psychiatrist: Gee Amador DO     Address: [address] [place] [address], [address] [address]    Phone Number: [phone]           Other Providers:    [facility] : [place] [phone]      Attend all scheduled appointments with your outpatient providers. Call at least 24 hours in advance if you need to reschedule an appointment to ensure continued access to your outpatient providers.     Major Treatments, Procedures and Findings:  You were provided with: a psychiatric assessment, assessed for medical stability, medication evaluation and/or management, group therapy and milieu management    Symptoms to Report: feeling more aggressive, increased confusion, losing more sleep, mood getting worse or thoughts of suicide    Early warning signs can include: increased depression or anxiety sleep disturbances increased thoughts or behaviors of suicide or self-harm  increased unusual thinking, such as paranoia or hearing voices    Safety and Wellness:  Take all medicines as directed.  Make no changes unless your doctor suggests them.  Follow treatment recommendations.  Refrain from alcohol and non-prescribed drugs.  Ask your support system to help you reduce your access to items that could harm yourself or others. If there is a concern for  "safety, call 911.    Resources:   Crisis Intervention: 969.828.2326 or 210-522-5677 (TTY: 219.355.1337).  Call anytime for help.  National [place] on Mental Illness (www.mn.adriane.org): 461.143.7147 or 544-673-4175.  Suicide Awareness Voices of Education (SAVE) (www.save.org): 126-205-QCNX (9391)  National Suicide Prevention Line (www.mentalhealthmn.org): 385-619-CETB (9614)  Mental Health Consumer/Survivor Network of [place] (www.mhcsn.net): 318.523.9650 or 619-384-9840  Mental Health Association of [place] (www.mentalhealth.org): 121.511.7750 or 424-609-7011  [facility] Crisis Response 970 945-0874  Text 4 Life: txt \"LIFE\" to 27985 for immediate support and crisis intervention  Crisis text line: Text \"[place]\" to 148322. Free, confidential, 24/7.  Crisis Intervention: 259.809.9450 or 560-170-7299. Call anytime for help.     General Medication Instructions:   See your medication sheet(s) for instructions.   Take all medicines as directed.  Make no changes unless your doctor suggests them.   Go to all your doctor visits.  Be sure to have all your required lab tests. This way, your medicines can be refilled on time.  Do not use any drugs not prescribed by your doctor.  Avoid alcohol.    Advance Directives:   Scanned document on file with [place]? No scanned doc  Is document scanned? Pt states no documents  Honoring Choices Your Rights Handout: Informed and given  Was more information offered? Pt declined    The Treatment team has appreciated the opportunity to work with you. If you have any questions or concerns about your recent admission, you can contact the unit which can receive your call 24 hours a day, 7 days a week. They will be able to get in touch with a Provider if needed. The unit number is [phone].      "

## 2021-07-29 NOTE — PLAN OF CARE
Problem: Adult Inpatient Plan of Care  Goal: Absence of Hospital-Acquired Illness or Injury  Outcome: Improving  Goal: Optimal Comfort and Wellbeing  Outcome: Improving  Intervention: Provide Person-Centered Care  Recent Flowsheet Documentation  Taken 7/28/2021 1600 by Amparo Paulino, RN  Trust Relationship/Rapport:    care explained    choices provided    emotional support provided    questions answered    questions encouraged    reassurance provided    thoughts/feelings acknowledged     Problem: Suicidal Behavior  Goal: Suicidal Behavior is Absent or Managed  Outcome: Improving     Problem: Behavioral Health Plan of Care  Goal: Plan of Care Review  Outcome: Improving  Flowsheets (Taken 7/28/2021 1600)  Plan of Care Reviewed With: patient  Patient Agreement with Plan of Care: agrees  Goal: Adheres to Safety Considerations for Self and Others  Outcome: Improving  Goal: Absence of New-Onset Illness or Injury  Outcome: Improving     Patient up and visible on the unit most part of the shift, pacing the hallway, flat affect on approach, does not engage with peers while in the milieu, denies all psych symptoms, denies hearing voices, no SI/SIB, patient verbalized to contract for safety, safety checks in place every 15 minutes per unit policy, medication compliant, patient is eating and driking fine, Will offer support as needed per plan of care.

## 2021-07-29 NOTE — PLAN OF CARE
Pt attended the structured Therapeutic Recreation group, participating in a group activity. Pt participated in group discussion, leisure participation, and social engagement to gain self-esteem, manage behaviors, improve social skills, decrease isolation, and reduce anxiety/depression.   Pt participated for approximately half of the activity. Pt helped contribute to the clues and descriptions throughout the activity. Pt was a full participant for the duration of the group. Pt seemed distracted and got up, walking to the door several times. Pt ultimately left nursing home through group.

## 2021-07-29 NOTE — PLAN OF CARE
Problem: Adult Inpatient Plan of Care  Goal: Plan of Care Review  Outcome: Adequate for Discharge  Flowsheets  Taken 7/29/2021 1122  Plan of Care Reviewed With: patient  Taken 7/29/2021 1103  Plan of Care Reviewed With: patient     Pt discharged to IRTS. Pt denies SI/SIB thoughts or intent. Pt states she feels safe to leave the hospital and is looking forward to discharge. All belongings were returned to pt. Pt and guardian expressed understanding of medication instructions and appointments.

## 2021-09-28 NOTE — DISCHARGE SUMMARY
".mosPsychiatric Discharge Summary    Erika Claudio MRN# [id number]   Age: 26 year old YOB: 1995     Date of Admission:  7/15/2021  Date of Discharge:  7/29/2021 11:29 AM  Admitting Physician:  Willard Maria MD  Discharge Physician:  Henry Sharp         Event Leading to Hospitalization:   Erika Claudio is a 25 year old female previously diagnosed with schizoaffective disorder, intellectual disability, PTSD, ADHD, and substance use disorder (amphetamine) who presented on 07/16/2021 with symptoms of psychosis and aggression in the context of potential medication non-adherence.     Per ED Note:  \"Erika Claudio is a 25 year old female with a medical history significant for intellectual disability, schizoaffective disorder versus bipolar affective disorder, ADHD, PTSD and methamphetamine abuse who presents to the Emergency Department for mental health evaluation.  Patient here reports that she was angry at her brother so her brother called the  on her.  She reports that she was brought here by the police for further evaluation.  Patient reports that she was angry at her brother because she thought he was calling her names.  She denies trying to hurt her brother.  Patient denies any suicidal or homicidal ideation.  She denies any hallucinations.  She denies any drug or alcohol use.     Per EMS, they were told by the patient's family that she has a history of schizoaffective disorder and her family was unable to handle her tonight, so they wanted her to be brought here for evaluation.  EMS also reported that the patient was seen at [facility] on 7/12/2021, but we do not have access to these records.\"      Per DEC:   \"Patient identifies historical diagnoses of Schizoaffective D/O, Intellectual Disability, PTSD and Amphetamine Use D/O. Pt reports ambulance was called tonight because she had punched a hole in the door of her brother's bedroom because \"I was mad\" at him calling her " "names.  She says she did this Thursday morning about 9 AM, but he was not there, had gone to work (she thought he was home).  She says that when he later got home he and her mother \"thought I was hearing voices - which I'm not - and sent me here\".  Pt denies she has symptoms of psychosis, but admits she hears people \"whispering\" about her and notes that since her arrival she knows that security and other patients have been whispering about her in the ED.  She says she gets mad about this, feels antagonized and then gets agitated.  She says she has not been sleeping well, her \"sleep schedule is off\", awake much of the night then sleeping a lot in the day.  She often can't remember recent events, not clear if she is avoiding the response or clearly can't recall.  She says she has not been taking her medications because she doesn't like them, denies recent drug or alcohol use, denies recent or current suicidal thoughts.  She says that \"a long time ago\" she has had suicidal, self injurious and has engaged in some property damage, but none of these in \"years\".  She is not currently seeing a therapist.\"          See Admission note:         DIagnoses:     # Schizoaffective Disorder  # ADHD  # PTSD  # History of Methamphetamine Use          Labs:     Amphetamines Urine   Date Value Ref Range Status   07/16/2021 Screen Negative Screen Negative Final     Comment:     Cutoff for a negative amphetamine is 500 ng/mL or less.     Barbiturates Urine   Date Value Ref Range Status   07/16/2021 Screen Negative Screen Negative Final     Comment:     Cutoff for a negative barbiturate is 200 ng/mL or less.     Cannabinoids Urine   Date Value Ref Range Status   07/16/2021 Screen Negative Screen Negative Final     Comment:     Cutoff for a negative cannabinoid is 50 ng/mL or less.     Cocaine Urine   Date Value Ref Range Status   07/16/2021 Screen Negative Screen Negative Final     Comment:     Cutoff for a negative cocaine is 300 ng/mL " or less.     Opiates Urine   Date Value Ref Range Status   07/16/2021 Screen Negative Screen Negative Final     Comment:     Cutoff for a negative opiate is 300 ng/mL or less.              Consults:   No consultations were requested during this admission         Hospital Course:   Erika Claudio was admitted to station 20 with attending Henry Sharp on commitment and will be treated in the therapeutic milieu with appropriate individual and group therapies.  PTA medications depakote, seroquel, atomoxetine, and hydroxyzine. We discontinued the depakote and seroquel. Atomoxetine was held during admission. She was started on olanzapine which we increased from 5 to 30 mg over time.       Erika Claudio did participate in groups and was visible in the milieu.     The patient's symptoms of psychosis improved.     Erika Claudio was released to group home. At the time of discharge Erika Claudio was determined to not be a danger to herself or others.          Discharge Medications:     Discharge Medication List as of 7/29/2021 10:59 AM      START taking these medications    Details   hydrOXYzine (ATARAX) 25 MG tablet Take 1 tablet (25 mg) by mouth every 4 hours as needed for anxiety, Disp-60 tablet, R-0, E-Prescribe      melatonin 3 MG tablet Take 1 tablet (3 mg) by mouth nightly as needed for sleep, Disp-30 tablet, R-0, E-Prescribe      OLANZapine (ZYPREXA) 15 MG tablet Take 2 tablets (30 mg) by mouth At Bedtime, Disp-60 tablet, R-1, E-Prescribe         CONTINUE these medications which have NOT CHANGED    Details   docosanol (ABREVA) 10 % CREA cream Apply topically 5 x daily PRN for cold sore treatmentHistorical         STOP taking these medications       atomoxetine (STRATTERA) 40 MG capsule Comments:   Reason for Stopping:         hydrOXYzine (VISTARIL) 25 MG capsule Comments:   Reason for Stopping:         medroxyPROGESTERone (DEPO-PROVERA) 150 MG/ML IM injection Comments:   Reason for Stopping:          "QUEtiapine (SEROQUEL) 25 MG tablet Comments:   Reason for Stopping:                    Psychiatric Examination:   Appearance:  poorly groomed and fatigued  Muscle Strength and Tone: normal  Behavior (Psychomotor):  no evidence of tardive dyskinesia, dystonia, or tics  Eye Contact:  fair/intense  Speech:  clear, coherent  Mood:  \"fine\"  Affect:  Anxious, somewhat guarded though less so than previously  Attitude:  guarded  Thought Process:  goal directed  Thought Content:  no evidence of suicidal ideation or homicidal ideation  Associations:  no loose associations  Insight:  limited  Judgment:  limited   Fund of Knowledge: low/appropriate  Oriented to:  time, person, and place  Attention Span and Concentration:  limited  Recent and Remote Memory:  limited         Discharge Plan:   Health Care Follow-up:   [facility]  isson Date/Time: 7/29/2021 at 11:00 AM.    Address: [address] [place] [id number]   Phone Number: [phone]     [facility]  Appointment Date/Time: 8/3/2021 at 1:40 PM. This will be a zoom appointment  Psychiatrist: Gee Amador DO     Address: [address] [place] [address], [address] [address]    Phone Number: [phone]            Henry Luna MD on 9/28/2021 at 2:51 PM    "

## 2022-11-28 ENCOUNTER — LAB REQUISITION (OUTPATIENT)
Dept: LAB | Facility: CLINIC | Age: 27
End: 2022-11-28

## 2022-11-28 DIAGNOSIS — Z12.4 ENCOUNTER FOR SCREENING FOR MALIGNANT NEOPLASM OF CERVIX: ICD-10-CM

## 2022-11-28 PROCEDURE — 87624 HPV HI-RISK TYP POOLED RSLT: CPT | Performed by: OBSTETRICS & GYNECOLOGY

## 2022-11-28 PROCEDURE — G0145 SCR C/V CYTO,THINLAYER,RESCR: HCPCS | Performed by: OBSTETRICS & GYNECOLOGY

## 2022-11-28 PROCEDURE — G0124 SCREEN C/V THIN LAYER BY MD: HCPCS

## 2022-12-06 LAB
BKR LAB AP GYN ADEQUACY: ABNORMAL
BKR LAB AP GYN INTERPRETATION: ABNORMAL
BKR LAB AP HPV REFLEX: ABNORMAL
BKR LAB AP LMP: ABNORMAL
BKR LAB AP PREVIOUS ABNL DX: ABNORMAL
BKR LAB AP PREVIOUS ABNORMAL: ABNORMAL
PATH REPORT.COMMENTS IMP SPEC: ABNORMAL
PATH REPORT.COMMENTS IMP SPEC: ABNORMAL
PATH REPORT.RELEVANT HX SPEC: ABNORMAL

## 2022-12-08 LAB
HUMAN PAPILLOMA VIRUS 16 DNA: NEGATIVE
HUMAN PAPILLOMA VIRUS 18 DNA: NEGATIVE
HUMAN PAPILLOMA VIRUS FINAL DIAGNOSIS: NORMAL
HUMAN PAPILLOMA VIRUS OTHER HR: NEGATIVE

## 2023-05-17 ENCOUNTER — LAB REQUISITION (OUTPATIENT)
Dept: LAB | Facility: CLINIC | Age: 28
End: 2023-05-17

## 2023-05-17 DIAGNOSIS — Z11.59 ENCOUNTER FOR SCREENING FOR OTHER VIRAL DISEASES: ICD-10-CM

## 2023-05-17 DIAGNOSIS — E22.1 HYPERPROLACTINEMIA (H): ICD-10-CM

## 2023-05-17 DIAGNOSIS — Z11.4 ENCOUNTER FOR SCREENING FOR HUMAN IMMUNODEFICIENCY VIRUS (HIV): ICD-10-CM

## 2023-05-17 DIAGNOSIS — N91.2 AMENORRHEA, UNSPECIFIED: ICD-10-CM

## 2023-05-17 PROCEDURE — 83001 ASSAY OF GONADOTROPIN (FSH): CPT | Performed by: OBSTETRICS & GYNECOLOGY

## 2023-05-17 PROCEDURE — 84439 ASSAY OF FREE THYROXINE: CPT | Performed by: OBSTETRICS & GYNECOLOGY

## 2023-05-17 PROCEDURE — 87389 HIV-1 AG W/HIV-1&-2 AB AG IA: CPT | Performed by: OBSTETRICS & GYNECOLOGY

## 2023-05-17 PROCEDURE — 86803 HEPATITIS C AB TEST: CPT | Performed by: OBSTETRICS & GYNECOLOGY

## 2023-05-17 PROCEDURE — 84146 ASSAY OF PROLACTIN: CPT | Performed by: OBSTETRICS & GYNECOLOGY

## 2023-05-17 PROCEDURE — 87340 HEPATITIS B SURFACE AG IA: CPT | Performed by: OBSTETRICS & GYNECOLOGY

## 2023-05-17 PROCEDURE — 86780 TREPONEMA PALLIDUM: CPT | Performed by: OBSTETRICS & GYNECOLOGY

## 2023-05-17 PROCEDURE — 82670 ASSAY OF TOTAL ESTRADIOL: CPT | Performed by: OBSTETRICS & GYNECOLOGY

## 2023-05-17 PROCEDURE — 84443 ASSAY THYROID STIM HORMONE: CPT | Performed by: OBSTETRICS & GYNECOLOGY

## 2023-05-17 PROCEDURE — 84702 CHORIONIC GONADOTROPIN TEST: CPT | Performed by: OBSTETRICS & GYNECOLOGY

## 2023-05-18 LAB
ESTRADIOL SERPL-MCNC: 29 PG/ML
FSH SERPL IRP2-ACNC: 2.8 MIU/ML
HCG INTACT+B SERPL-ACNC: <1 MIU/ML
PROLACTIN SERPL 3RD IS-MCNC: 134 NG/ML (ref 5–23)
T4 FREE SERPL-MCNC: 1.7 NG/DL (ref 0.9–1.7)
TSH SERPL DL<=0.005 MIU/L-ACNC: 5.79 UIU/ML (ref 0.3–4.2)

## 2023-05-19 LAB
HBV SURFACE AG SERPL QL IA: NONREACTIVE
HCV AB SERPL QL IA: NONREACTIVE
HIV 1+2 AB+HIV1 P24 AG SERPL QL IA: NONREACTIVE
T PALLIDUM AB SER QL: NONREACTIVE